# Patient Record
Sex: MALE | Race: WHITE | NOT HISPANIC OR LATINO
[De-identification: names, ages, dates, MRNs, and addresses within clinical notes are randomized per-mention and may not be internally consistent; named-entity substitution may affect disease eponyms.]

---

## 2019-03-10 ENCOUNTER — FORM ENCOUNTER (OUTPATIENT)
Age: 54
End: 2019-03-10

## 2019-03-11 ENCOUNTER — OUTPATIENT (OUTPATIENT)
Dept: OUTPATIENT SERVICES | Facility: HOSPITAL | Age: 54
LOS: 1 days | End: 2019-03-11
Payer: COMMERCIAL

## 2019-03-11 ENCOUNTER — APPOINTMENT (OUTPATIENT)
Dept: PULMONOLOGY | Facility: CLINIC | Age: 54
End: 2019-03-11
Payer: COMMERCIAL

## 2019-03-11 VITALS — BODY MASS INDEX: 44.1 KG/M2 | HEIGHT: 71 IN | WEIGHT: 315 LBS

## 2019-03-11 VITALS — HEART RATE: 76 BPM | OXYGEN SATURATION: 97 % | SYSTOLIC BLOOD PRESSURE: 124 MMHG | DIASTOLIC BLOOD PRESSURE: 76 MMHG

## 2019-03-11 DIAGNOSIS — Z80.6 FAMILY HISTORY OF LEUKEMIA: ICD-10-CM

## 2019-03-11 DIAGNOSIS — Z87.898 PERSONAL HISTORY OF OTHER SPECIFIED CONDITIONS: ICD-10-CM

## 2019-03-11 DIAGNOSIS — I25.10 ATHEROSCLEROTIC HEART DISEASE OF NATIVE CORONARY ARTERY W/OUT ANGINA PECTORIS: ICD-10-CM

## 2019-03-11 DIAGNOSIS — Z78.9 OTHER SPECIFIED HEALTH STATUS: ICD-10-CM

## 2019-03-11 DIAGNOSIS — R06.02 SHORTNESS OF BREATH: ICD-10-CM

## 2019-03-11 DIAGNOSIS — Z00.00 ENCOUNTER FOR GENERAL ADULT MEDICAL EXAMINATION W/OUT ABNORMAL FINDINGS: ICD-10-CM

## 2019-03-11 DIAGNOSIS — Z01.811 ENCOUNTER FOR PREPROCEDURAL RESPIRATORY EXAMINATION: ICD-10-CM

## 2019-03-11 PROCEDURE — 94664 DEMO&/EVAL PT USE INHALER: CPT | Mod: 59

## 2019-03-11 PROCEDURE — 94729 DIFFUSING CAPACITY: CPT

## 2019-03-11 PROCEDURE — 99204 OFFICE O/P NEW MOD 45 MIN: CPT | Mod: 25

## 2019-03-11 PROCEDURE — 94727 GAS DIL/WSHOT DETER LNG VOL: CPT

## 2019-03-11 PROCEDURE — 71046 X-RAY EXAM CHEST 2 VIEWS: CPT | Mod: 26

## 2019-03-11 PROCEDURE — 85018 HEMOGLOBIN: CPT | Mod: QW

## 2019-03-11 PROCEDURE — 94060 EVALUATION OF WHEEZING: CPT

## 2019-03-11 RX ORDER — BLOOD SUGAR DIAGNOSTIC
100 STRIP MISCELLANEOUS
Refills: 0 | Status: ACTIVE | COMMUNITY

## 2019-03-11 NOTE — DISCUSSION/SUMMARY
[FreeTextEntry1] : \par #1. Trial of Incruse for moderate COPD\par #2. ProAir as needed\par #3. There may be a restrictive component with weight due to morbid obesity\par #4. Home PSG to evaluate for possible MELIZA; consider empiric CPAP for presumed MELIZA post-op\par #5. Preop CXR\par #6. Diet and exercise for weight loss \par #7. F/u in 2 weeks with CXR and spirometry to assess response to Incruse\par #8. Further pre-op recommendations will be forthcoming pending the above w/u\par

## 2019-03-11 NOTE — PROCEDURE
[FreeTextEntry1] : PFTs 3/11/19 - Mildly reduced FVC and moderately reduced FEV1 with an obstructive pattern but without a significant BD response. Lung volumes were near normal with a normal diffusion capacity.

## 2019-03-11 NOTE — CONSULT LETTER
[Dear  ___] : Dear  [unfilled], [Consult Letter:] : I had the pleasure of evaluating your patient, [unfilled]. [Please see my note below.] : Please see my note below. [Consult Closing:] : Thank you very much for allowing me to participate in the care of this patient.  If you have any questions, please do not hesitate to contact me. [Sincerely,] : Sincerely, [DrJayson  ___] : Dr. ALFORD [FreeTextEntry3] : Elvis Hanna MD, FCCP, SANDHYA. ABSM\par

## 2019-03-11 NOTE — REVIEW OF SYSTEMS
[As Noted in HPI] : as noted in HPI [Fever] : no fever [Chills] : no chills [Dry Eyes] : no dryness of the eyes [Eye Irritation] : no ~T irritation of the eyes [Nasal Congestion] : no nasal congestion [Epistaxis] : no nosebleeds [Postnasal Drip] : no postnasal drip [Sinus Problems] : no sinus problems [Cough] : no cough [Sputum] : not coughing up ~M sputum [Dyspnea] : no dyspnea [Chest Tightness] : no chest tightness [Pleuritic Pain] : no pleuritic pain [Wheezing] : no wheezing [Hypertension] : no ~T hypertension [Chest Discomfort] : no chest discomfort [Dysrhythmia] : no dysrhythmia [Murmurs] : no murmurs were heard [Palpitations] : no palpitations [Edema] : ~T edema was not present [Hay Fever] : no hay fever [Itchy Eyes] : no itching of ~T the eyes [Reflux] : no reflux [Nausea] : no nausea [Vomiting] : no vomiting [Constipation] : no constipation [Diarrhea] : no diarrhea [Abdominal Pain] : no abdominal pain [Dysuria] : no dysuria [Trauma] : no ~T physical trauma [Fracture] : no fracture [Anemia] : no anemia [Headache] : no headache [Dizziness] : no dizziness [Syncope] : no fainting [Numbness] : no numbness [Paralysis] : no paralysis was seen [Seizures] : no seizures [Depression] : no depression [Anxiety] : no anxiety [Diabetes] : no diabetes mellitus [Thyroid Problem] : no thyroid problem

## 2019-03-11 NOTE — HISTORY OF PRESENT ILLNESS
[Snoring] : snoring [Excess Weight] : excess weight [Dyspnea] : dyspnea [Back Pain] : back pain [Low Calorie Diet] : low calorie diet [Fair Compliance] : fair compliance with treatment [Fair Tolerance] : fair tolerance of treatment [Poor Symptom Control] : poor symptom control [Coronary Artery Disease] : coronary artery disease [High] : high [Low Calorie] : low calorie [Well Balanced Diet] : well balanced meals [___ Times/Week] : exercises [unfilled] times per week [Walking] : walking [FreeTextEntry1] : Patient c/o SOBOE but is otherwise without associated respiratory complaints. He reports these symptoms at higher altitudes. He is an ex-smoker of up to 1.5 ppd x 30 years but quit in 2017.\par He presents for evaluation prior to his upcoming hernia repair and subsequent bariatric surgery.\par  [Excessive Daytime Sleepiness] : no excessive daytime sleepiness [Witnessed Apnea During Sleep] : no witnessed apnea during sleep [Witnessed Gasping During Sleep] : no witnessed gasping during sleep [Unrefreshing Sleep] : no unrefreshing sleep [Sleepy When Sedentary] : no sleepy when sedentary [Initial Evaluation] : an initial evaluation of [Dyspnea on Exertion] : dyspnea on exertion [Currently Experiencing] : The patient is currently experiencing symptoms. [None] : The patient is not currently being treated for this problem

## 2019-03-20 ENCOUNTER — APPOINTMENT (OUTPATIENT)
Dept: PULMONOLOGY | Facility: CLINIC | Age: 54
End: 2019-03-20
Payer: COMMERCIAL

## 2019-03-20 VITALS
RESPIRATION RATE: 14 BRPM | BODY MASS INDEX: 43.1 KG/M2 | HEART RATE: 68 BPM | OXYGEN SATURATION: 97 % | WEIGHT: 309 LBS | SYSTOLIC BLOOD PRESSURE: 126 MMHG | DIASTOLIC BLOOD PRESSURE: 74 MMHG

## 2019-03-20 PROCEDURE — 94010 BREATHING CAPACITY TEST: CPT

## 2019-03-20 PROCEDURE — 99214 OFFICE O/P EST MOD 30 MIN: CPT | Mod: 25

## 2019-03-20 RX ORDER — OFLOXACIN 3 MG/ML
0.3 SOLUTION/ DROPS OPHTHALMIC
Qty: 5 | Refills: 0 | Status: DISCONTINUED | COMMUNITY
Start: 2018-11-06

## 2019-03-20 RX ORDER — PRAVASTATIN SODIUM 10 MG/1
10 TABLET ORAL
Qty: 30 | Refills: 0 | Status: DISCONTINUED | COMMUNITY
Start: 2018-04-25

## 2019-03-20 RX ORDER — NEBIVOLOL HYDROCHLORIDE 5 MG/1
5 TABLET ORAL
Qty: 30 | Refills: 0 | Status: ACTIVE | COMMUNITY
Start: 2018-04-25

## 2019-03-20 RX ORDER — CLOBETASOL PROPIONATE 0.5 MG/G
0.05 CREAM TOPICAL
Qty: 45 | Refills: 0 | Status: DISCONTINUED | COMMUNITY
Start: 2019-03-19

## 2019-03-20 RX ORDER — ECONAZOLE NITRATE 10 MG/G
1 CREAM TOPICAL
Qty: 85 | Refills: 0 | Status: DISCONTINUED | COMMUNITY
Start: 2018-09-07

## 2019-03-20 RX ORDER — NEBIVOLOL HYDROCHLORIDE 2.5 MG/1
2.5 TABLET ORAL
Refills: 0 | Status: DISCONTINUED | COMMUNITY
End: 2019-03-20

## 2019-03-20 RX ORDER — EVOLOCUMAB 140 MG/ML
140 INJECTION, SOLUTION SUBCUTANEOUS
Qty: 2 | Refills: 0 | Status: ACTIVE | COMMUNITY
Start: 2018-10-12

## 2019-03-20 RX ORDER — CLARITHROMYCIN 500 MG/1
500 TABLET, FILM COATED ORAL
Qty: 14 | Refills: 0 | Status: DISCONTINUED | COMMUNITY
Start: 2019-01-20

## 2019-03-20 RX ORDER — RAMIPRIL 2.5 MG/1
2.5 CAPSULE ORAL
Qty: 30 | Refills: 0 | Status: ACTIVE | COMMUNITY
Start: 2018-04-25

## 2019-03-20 RX ORDER — PRASUGREL 10 MG/1
10 TABLET, FILM COATED ORAL
Qty: 30 | Refills: 0 | Status: DISCONTINUED | COMMUNITY
Start: 2018-06-04

## 2019-03-20 NOTE — HISTORY OF PRESENT ILLNESS
[Snoring] : snoring [Excess Weight] : excess weight [Dyspnea] : dyspnea [Back Pain] : back pain [Low Calorie Diet] : low calorie diet [Fair Compliance] : fair compliance with treatment [Fair Tolerance] : fair tolerance of treatment [Poor Symptom Control] : poor symptom control [Coronary Artery Disease] : coronary artery disease [High] : high [Low Calorie] : low calorie [Well Balanced Diet] : well balanced meals [___ Times/Week] : exercises [unfilled] times per week [Walking] : walking [Follow-Up - Routine Clinic] : a routine clinic follow-up of [Dyspnea on Exertion] : dyspnea on exertion [Currently Experiencing] : The patient is currently experiencing symptoms. [None] : No associated symptoms are reported [FreeTextEntry1] : Patient c/o SOBOE but is otherwise without associated respiratory complaints. He reports these symptoms at higher altitudes. He is an ex-smoker of up to 1.5 ppd x 30 years but quit in 2017.\par He presents for evaluation prior to his upcoming hernia repair and subsequent bariatric surgery.\par  [Excessive Daytime Sleepiness] : no excessive daytime sleepiness [Witnessed Apnea During Sleep] : no witnessed apnea during sleep [Witnessed Gasping During Sleep] : no witnessed gasping during sleep [Sleepy When Sedentary] : no sleepy when sedentary [Unrefreshing Sleep] : no unrefreshing sleep [Anticholinergics (Inhalation)] : inhaled anticholinergics [Short-Acting Beta Agonists] : short-acting beta agonists [Good Compliance] : good compliance with treatment [Good Tolerance] : good tolerance of treatment [Good Symptom Control] : good symptom control

## 2019-03-20 NOTE — REVIEW OF SYSTEMS
[As Noted in HPI] : as noted in HPI [Fever] : no fever [Chills] : no chills [Dry Eyes] : no dryness of the eyes [Eye Irritation] : no ~T irritation of the eyes [Nasal Congestion] : no nasal congestion [Epistaxis] : no nosebleeds [Postnasal Drip] : no postnasal drip [Sinus Problems] : no sinus problems [Cough] : no cough [Sputum] : not coughing up ~M sputum [Dyspnea] : no dyspnea [Chest Tightness] : no chest tightness [Pleuritic Pain] : no pleuritic pain [Wheezing] : no wheezing [Hypertension] : no ~T hypertension [Chest Discomfort] : no chest discomfort [Dysrhythmia] : no dysrhythmia [Murmurs] : no murmurs were heard [Palpitations] : no palpitations [Edema] : ~T edema was not present [Hay Fever] : no hay fever [Itchy Eyes] : no itching of ~T the eyes [Reflux] : no reflux [Nausea] : no nausea [Vomiting] : no vomiting [Constipation] : no constipation [Diarrhea] : no diarrhea [Abdominal Pain] : no abdominal pain [Dysuria] : no dysuria [Trauma] : no ~T physical trauma [Anemia] : no anemia [Fracture] : no fracture [Headache] : no headache [Dizziness] : no dizziness [Numbness] : no numbness [Syncope] : no fainting [Seizures] : no seizures [Paralysis] : no paralysis was seen [Depression] : no depression [Anxiety] : no anxiety [Diabetes] : no diabetes mellitus [Thyroid Problem] : no thyroid problem

## 2019-03-20 NOTE — PROCEDURE
[FreeTextEntry1] : PFTs 3/11/19 - Mildly reduced FVC and moderately reduced FEV1 with an obstructive pattern but without a significant BD response. Lung volumes were near normal with a normal diffusion capacity.\par Spirometry 3/20/19 - Moderate COPD without change from previous despite Incruse though has only been on it for 10 days.

## 2019-03-20 NOTE — REASON FOR VISIT
[Follow-Up] : a follow-up visit [COPD] : COPD [Shortness of Breath] : shortness of Breath [Sleep Apnea] : sleep apnea [FreeTextEntry2] : morbid obesity, clearance

## 2019-03-20 NOTE — DISCUSSION/SUMMARY
[FreeTextEntry1] : \par #1. Trial of Incruse for moderate COPD; consider change to Breo if no improvement\par #2. ProAir as needed\par #3. There may be a restrictive component with weight due to morbid obesity\par #4. Home PSG to evaluate for possible MELIZA; consider empiric CPAP for presumed MELIZA post-op\par #5. Preop CXR was clear\par #6. Diet and exercise for weight loss \par #7. F/u after PSG with PFTs\par #8. The patient is at mildly increased risk for post op respiratory complications including but not limited to respiratory failure or prolonged intubation if GA is used. These risks are not prohibitive for GA although an alternate anesthesia would be preferred if able. I would recommend Duoneb therapy on call to the OR with Duoneb Q6h post-op until the patient can resume inhaler therapy. I would also recommend post op incentive spirometry, GI/DVT prophylaxis as needed, early ambulation as able, and adequate pain control. The patient has not had his PSG to evaluate for MELIZA so no sleep assessment can be made but would recommend empiric CPAP at 14 cm of water if needed post-op and with sleep\par #9. F/u with PCP for w/u of possible anemia given prior low FS Hgb \par

## 2019-03-21 ENCOUNTER — APPOINTMENT (OUTPATIENT)
Dept: PULMONOLOGY | Facility: CLINIC | Age: 54
End: 2019-03-21

## 2019-03-27 ENCOUNTER — RESULT REVIEW (OUTPATIENT)
Age: 54
End: 2019-03-27

## 2019-04-05 ENCOUNTER — OUTPATIENT (OUTPATIENT)
Dept: OUTPATIENT SERVICES | Facility: HOSPITAL | Age: 54
LOS: 1 days | End: 2019-04-05
Payer: COMMERCIAL

## 2019-04-05 DIAGNOSIS — G47.33 OBSTRUCTIVE SLEEP APNEA (ADULT) (PEDIATRIC): ICD-10-CM

## 2019-04-05 PROCEDURE — G0399: CPT

## 2019-04-05 PROCEDURE — 95806 SLEEP STUDY UNATT&RESP EFFT: CPT

## 2019-04-05 PROCEDURE — 95806 SLEEP STUDY UNATT&RESP EFFT: CPT | Mod: 26

## 2019-05-28 ENCOUNTER — APPOINTMENT (OUTPATIENT)
Dept: PULMONOLOGY | Facility: CLINIC | Age: 54
End: 2019-05-28
Payer: COMMERCIAL

## 2019-05-28 VITALS — HEIGHT: 71 IN | WEIGHT: 315 LBS | BODY MASS INDEX: 44.1 KG/M2

## 2019-05-28 VITALS — OXYGEN SATURATION: 97 % | SYSTOLIC BLOOD PRESSURE: 128 MMHG | DIASTOLIC BLOOD PRESSURE: 84 MMHG | HEART RATE: 63 BPM

## 2019-05-28 PROCEDURE — 94060 EVALUATION OF WHEEZING: CPT

## 2019-05-28 PROCEDURE — 94664 DEMO&/EVAL PT USE INHALER: CPT | Mod: 59

## 2019-05-28 PROCEDURE — 99214 OFFICE O/P EST MOD 30 MIN: CPT | Mod: 25

## 2019-05-28 NOTE — HISTORY OF PRESENT ILLNESS
[Snoring] : snoring [Dyspnea] : dyspnea [Excess Weight] : excess weight [Low Calorie Diet] : low calorie diet [Back Pain] : back pain [Fair Tolerance] : fair tolerance of treatment [Fair Compliance] : fair compliance with treatment [Poor Symptom Control] : poor symptom control [Coronary Artery Disease] : coronary artery disease [High] : high [Low Calorie] : low calorie [Well Balanced Diet] : well balanced meals [Walking] : walking [___ Times/Week] : exercises [unfilled] times per week [Follow-Up - Routine Clinic] : a routine clinic follow-up of [Dyspnea on Exertion] : dyspnea on exertion [None] : No associated symptoms are reported [Currently Experiencing] : The patient is currently experiencing symptoms. [Anticholinergics (Inhalation)] : inhaled anticholinergics [Short-Acting Beta Agonists] : short-acting beta agonists [Good Compliance] : good compliance with treatment [Good Tolerance] : good tolerance of treatment [Good Symptom Control] : good symptom control [FreeTextEntry1] : Patient c/o SOBOE but is otherwise without associated respiratory complaints. He reports these symptoms at higher altitudes. He is an ex-smoker of up to 1.5 ppd x 30 years but quit in 2017.\par He presents for evaluation prior to his upcoming hernia repair and subsequent bariatric surgery.\par  [Excessive Daytime Sleepiness] : no excessive daytime sleepiness [Witnessed Apnea During Sleep] : no witnessed apnea during sleep [Unrefreshing Sleep] : no unrefreshing sleep [Witnessed Gasping During Sleep] : no witnessed gasping during sleep [Sleepy When Sedentary] : no sleepy when sedentary

## 2019-05-28 NOTE — DISCUSSION/SUMMARY
[FreeTextEntry1] : \par #1. Trial of Breo 100 for moderate COPD given no improvement with Incruse\par #2. ProAir as needed\par #3. There may be a restrictive component with weight due to morbid obesity\par #4. CPAP titration study to treat severe MELIZA; otherwise could consider autoCPAP for therapy\par #5. Preop CXR was clear\par #6. Diet and exercise for weight loss \par #7. F/u one month after starting CPAP therapy\par #8. F/u with PCP for w/u of possible anemia given prior low FS Hgb \par

## 2019-05-28 NOTE — CONSULT LETTER
[Dear  ___] : Dear  [unfilled], [Consult Closing:] : Thank you very much for allowing me to participate in the care of this patient.  If you have any questions, please do not hesitate to contact me. [Consult Letter:] : I had the pleasure of evaluating your patient, [unfilled]. [Please see my note below.] : Please see my note below. [Sincerely,] : Sincerely, [DrJayson  ___] : Dr. ALFORD [FreeTextEntry3] : Elvis Hanna MD, FCCP, SANDHYA. ABSM\par

## 2019-05-28 NOTE — REVIEW OF SYSTEMS
[As Noted in HPI] : as noted in HPI [Dry Eyes] : no dryness of the eyes [Eye Irritation] : no ~T irritation of the eyes [Chills] : no chills [Fever] : no fever [Epistaxis] : no nosebleeds [Nasal Congestion] : no nasal congestion [Postnasal Drip] : no postnasal drip [Sinus Problems] : no sinus problems [Cough] : no cough [Dyspnea] : no dyspnea [Sputum] : not coughing up ~M sputum [Chest Tightness] : no chest tightness [Pleuritic Pain] : no pleuritic pain [Wheezing] : no wheezing [Hypertension] : no ~T hypertension [Chest Discomfort] : no chest discomfort [Dysrhythmia] : no dysrhythmia [Murmurs] : no murmurs were heard [Palpitations] : no palpitations [Hay Fever] : no hay fever [Edema] : ~T edema was not present [Reflux] : no reflux [Itchy Eyes] : no itching of ~T the eyes [Nausea] : no nausea [Vomiting] : no vomiting [Diarrhea] : no diarrhea [Constipation] : no constipation [Abdominal Pain] : no abdominal pain [Dysuria] : no dysuria [Anemia] : no anemia [Fracture] : no fracture [Trauma] : no ~T physical trauma [Dizziness] : no dizziness [Syncope] : no fainting [Headache] : no headache [Paralysis] : no paralysis was seen [Numbness] : no numbness [Depression] : no depression [Seizures] : no seizures [Anxiety] : no anxiety [Thyroid Problem] : no thyroid problem [Diabetes] : no diabetes mellitus

## 2019-05-28 NOTE — PHYSICAL EXAM
[General Appearance - Well Developed] : well developed [General Appearance - In No Acute Distress] : no acute distress [Normal Appearance] : normal appearance [Normal Conjunctiva] : the conjunctiva exhibited no abnormalities [Low Lying Soft Palate] : low lying soft palate [Enlarged Base of the Tongue] : enlargement of the base of the tongue [II] : II [Neck Appearance] : the appearance of the neck was normal [Heart Sounds] : normal S1 and S2 [Heart Rate And Rhythm] : heart rate and rhythm were normal [] : no respiratory distress [Murmurs] : no murmurs present [Edema] : no peripheral edema present [Respiration, Rhythm And Depth] : normal respiratory rhythm and effort [Exaggerated Use Of Accessory Muscles For Inspiration] : no accessory muscle use [Auscultation Breath Sounds / Voice Sounds] : lungs were clear to auscultation bilaterally [Abdomen Soft] : soft [Abdomen Tenderness] : non-tender [Abnormal Walk] : normal gait [Nail Clubbing] : no clubbing of the fingernails [Cyanosis, Localized] : no localized cyanosis [No Focal Deficits] : no focal deficits [Oriented To Time, Place, And Person] : oriented to person, place, and time [Elongated Uvula] : no elongated uvula [FreeTextEntry1] : No abnormalities.

## 2019-05-28 NOTE — PROCEDURE
[FreeTextEntry1] : PFTs 3/11/19 - Mildly reduced FVC and moderately reduced FEV1 with an obstructive pattern but without a significant BD response. Lung volumes were near normal with a normal diffusion capacity.\par Spirometry 3/20/19 - Moderate COPD without change from previous despite Incruse though has only been on it for 10 days.\par Spirometry 5/28/19 - Moderate COPD despite Incruse so will change to Breo 100

## 2019-09-05 ENCOUNTER — OUTPATIENT (OUTPATIENT)
Dept: OUTPATIENT SERVICES | Facility: HOSPITAL | Age: 54
LOS: 1 days | End: 2019-09-05
Payer: COMMERCIAL

## 2019-09-05 DIAGNOSIS — G47.33 OBSTRUCTIVE SLEEP APNEA (ADULT) (PEDIATRIC): ICD-10-CM

## 2019-09-05 PROCEDURE — 95811 POLYSOM 6/>YRS CPAP 4/> PARM: CPT

## 2019-09-05 PROCEDURE — 95811 POLYSOM 6/>YRS CPAP 4/> PARM: CPT | Mod: 26

## 2019-09-17 ENCOUNTER — RX RENEWAL (OUTPATIENT)
Age: 54
End: 2019-09-17

## 2019-10-03 ENCOUNTER — APPOINTMENT (OUTPATIENT)
Dept: PULMONOLOGY | Facility: CLINIC | Age: 54
End: 2019-10-03
Payer: COMMERCIAL

## 2019-10-03 VITALS — SYSTOLIC BLOOD PRESSURE: 120 MMHG | OXYGEN SATURATION: 96 % | DIASTOLIC BLOOD PRESSURE: 70 MMHG | HEART RATE: 92 BPM

## 2019-10-03 DIAGNOSIS — Z01.811 ENCOUNTER FOR PREPROCEDURAL RESPIRATORY EXAMINATION: ICD-10-CM

## 2019-10-03 PROCEDURE — 85018 HEMOGLOBIN: CPT | Mod: QW

## 2019-10-03 PROCEDURE — 99215 OFFICE O/P EST HI 40 MIN: CPT | Mod: 25

## 2019-10-03 PROCEDURE — 94010 BREATHING CAPACITY TEST: CPT

## 2019-10-03 PROCEDURE — 94727 GAS DIL/WSHOT DETER LNG VOL: CPT

## 2019-10-03 PROCEDURE — 94729 DIFFUSING CAPACITY: CPT

## 2019-10-03 NOTE — HISTORY OF PRESENT ILLNESS
[Snoring] : snoring [Excess Weight] : excess weight [Dyspnea] : dyspnea [Back Pain] : back pain [Low Calorie Diet] : low calorie diet [Fair Compliance] : fair compliance with treatment [Fair Tolerance] : fair tolerance of treatment [Poor Symptom Control] : poor symptom control [Coronary Artery Disease] : coronary artery disease [High] : high [Low Calorie] : low calorie [Well Balanced Diet] : well balanced meals [___ Times/Week] : exercises [unfilled] times per week [Walking] : walking [Follow-Up - Routine Clinic] : a routine clinic follow-up of [Dyspnea on Exertion] : dyspnea on exertion [Currently Experiencing] : The patient is currently experiencing symptoms. [None] : No associated symptoms are reported [Short-Acting Beta Agonists] : short-acting beta agonists [Anticholinergics (Inhalation)] : inhaled anticholinergics [Good Compliance] : good compliance with treatment [Good Tolerance] : good tolerance of treatment [Good Symptom Control] : good symptom control [Excessive Daytime Sleepiness] : no excessive daytime sleepiness [FreeTextEntry1] : Patient c/o SOBOE but is otherwise without associated respiratory complaints. He reports these symptoms at higher altitudes. He is an ex-smoker of up to 1.5 ppd x 30 years but quit in 2017.\par He is s/p hernia surgery and appendectomy. \par He now presents for evaluation prior to his upcoming colonoscopy and endoscopy in anticipation of his upcoming bariatric surgery.\par  [Witnessed Apnea During Sleep] : no witnessed apnea during sleep [Witnessed Gasping During Sleep] : no witnessed gasping during sleep [Unrefreshing Sleep] : no unrefreshing sleep [Sleepy When Sedentary] : no sleepy when sedentary

## 2019-10-03 NOTE — DISCUSSION/SUMMARY
[FreeTextEntry1] : \par #1. Continue Breo 100 for moderate COPD given no improvement with Incruse; pt appears to be doing well with this inhaler\par #2. ProAir as needed\par #3. There may be a restrictive component with weight due to morbid obesity\par #4. Pt has severe MELIZA and autoCPAP was recommended but he does not feel he could tolerate CPAP therapy and would rather try an oral appliance instead\par #5. Preop CXR was clear\par #6. Diet and exercise for weight loss \par #7. Referred pt for a dental evaluation for an oral appliance as alternative therapy for treatment of MELIZA as he does not feel he can tolerate CPAP\par #8. F/u with PCP/GI for w/u of possible anemia given prior low FS Hgb \par #9. The patient is at mildly increased risk for post op respiratory complications including but not limited to respiratory failure under conscious sedation given his severe MELIZA and COPD. These risks are not prohibitive for the procedure but I would recommend Duoneb therapy on call to the OR with Duoneb Q6h post-op until the patient can resume inhaler therapy (Breo 100). I would also recommend post op incentive spirometry, GI/DVT prophylaxis as needed, early ambulation as able, and adequate pain control. Would recommend that O2 saturation should be monitored during and after the procedure. Finally, pt should have CPAP at 12 cm of water available to him during the colonoscopy and after the endoscopy.\par #10. F/u in 2-3 months

## 2019-10-03 NOTE — CONSULT LETTER
[Dear  ___] : Dear  [unfilled], [Consult Letter:] : I had the pleasure of evaluating your patient, [unfilled]. [Please see my note below.] : Please see my note below. [Consult Closing:] : Thank you very much for allowing me to participate in the care of this patient.  If you have any questions, please do not hesitate to contact me. [Sincerely,] : Sincerely, [DrJayson  ___] : Dr. ALFORD [FreeTextEntry3] : Elvis Hanna MD, FCCP, D. ABSM\par Pulmonary and Sleep Medicine\par [facility] [place]\par \par

## 2019-10-03 NOTE — REVIEW OF SYSTEMS
[As Noted in HPI] : as noted in HPI [Fever] : no fever [Chills] : no chills [Dry Eyes] : no dryness of the eyes [Eye Irritation] : no ~T irritation of the eyes [Nasal Congestion] : no nasal congestion [Postnasal Drip] : no postnasal drip [Epistaxis] : no nosebleeds [Cough] : no cough [Sinus Problems] : no sinus problems [Sputum] : not coughing up ~M sputum [Dyspnea] : no dyspnea [Chest Tightness] : no chest tightness [Pleuritic Pain] : no pleuritic pain [Wheezing] : no wheezing [Hypertension] : no ~T hypertension [Dysrhythmia] : no dysrhythmia [Chest Discomfort] : no chest discomfort [Murmurs] : no murmurs were heard [Palpitations] : no palpitations [Itchy Eyes] : no itching of ~T the eyes [Edema] : ~T edema was not present [Hay Fever] : no hay fever [Nausea] : no nausea [Reflux] : no reflux [Constipation] : no constipation [Vomiting] : no vomiting [Diarrhea] : no diarrhea [Dysuria] : no dysuria [Abdominal Pain] : no abdominal pain [Trauma] : no ~T physical trauma [Fracture] : no fracture [Anemia] : no anemia [Headache] : no headache [Dizziness] : no dizziness [Syncope] : no fainting [Numbness] : no numbness [Paralysis] : no paralysis was seen [Depression] : no depression [Anxiety] : no anxiety [Seizures] : no seizures [Thyroid Problem] : no thyroid problem [Diabetes] : no diabetes mellitus

## 2019-10-03 NOTE — PROCEDURE
[FreeTextEntry1] : PFTs 3/11/19 - Mildly reduced FVC and moderately reduced FEV1 with an obstructive pattern but without a significant BD response. Lung volumes were near normal with a normal diffusion capacity.\par Spirometry 3/20/19 - Moderate COPD without change from previous despite Incruse though has only been on it for 10 days.\par Spirometry 5/28/19 - Moderate COPD despite Incruse so will change to Breo 100\par PFTs 10/3/19 - Mildly reduced FVC and moderately reduced FEV1 with an obstructive pattern; lung volumes were normal except for an elevated RV with a mildly reduced diffusion capacity which corrected for alveolar volume.

## 2019-10-03 NOTE — PHYSICAL EXAM
[Normal Appearance] : normal appearance [General Appearance - Well Developed] : well developed [General Appearance - In No Acute Distress] : no acute distress [Normal Conjunctiva] : the conjunctiva exhibited no abnormalities [Low Lying Soft Palate] : low lying soft palate [Enlarged Base of the Tongue] : enlargement of the base of the tongue [II] : II [Neck Appearance] : the appearance of the neck was normal [Heart Sounds] : normal S1 and S2 [Heart Rate And Rhythm] : heart rate and rhythm were normal [Edema] : no peripheral edema present [Murmurs] : no murmurs present [] : no respiratory distress [Respiration, Rhythm And Depth] : normal respiratory rhythm and effort [Exaggerated Use Of Accessory Muscles For Inspiration] : no accessory muscle use [Auscultation Breath Sounds / Voice Sounds] : lungs were clear to auscultation bilaterally [Abdomen Soft] : soft [Abdomen Tenderness] : non-tender [Abnormal Walk] : normal gait [Nail Clubbing] : no clubbing of the fingernails [Cyanosis, Localized] : no localized cyanosis [No Focal Deficits] : no focal deficits [Oriented To Time, Place, And Person] : oriented to person, place, and time [Elongated Uvula] : no elongated uvula [FreeTextEntry1] : Moderate oropharyngeal crowding.

## 2019-11-14 ENCOUNTER — APPOINTMENT (OUTPATIENT)
Dept: PULMONOLOGY | Facility: CLINIC | Age: 54
End: 2019-11-14

## 2019-11-14 RX ORDER — VALACYCLOVIR 1 G/1
1 TABLET, FILM COATED ORAL
Qty: 30 | Refills: 0 | Status: DISCONTINUED | COMMUNITY
Start: 2019-04-09 | End: 2019-11-14

## 2019-11-14 RX ORDER — CIPROFLOXACIN HYDROCHLORIDE 500 MG/1
500 TABLET, FILM COATED ORAL
Qty: 20 | Refills: 0 | Status: DISCONTINUED | COMMUNITY
Start: 2019-07-13 | End: 2019-11-14

## 2019-11-19 ENCOUNTER — RX RENEWAL (OUTPATIENT)
Age: 54
End: 2019-11-19

## 2019-12-30 ENCOUNTER — RX RENEWAL (OUTPATIENT)
Age: 54
End: 2019-12-30

## 2020-01-08 ENCOUNTER — APPOINTMENT (OUTPATIENT)
Dept: PULMONOLOGY | Facility: CLINIC | Age: 55
End: 2020-01-08
Payer: COMMERCIAL

## 2020-01-08 VITALS — HEART RATE: 96 BPM | OXYGEN SATURATION: 96 % | SYSTOLIC BLOOD PRESSURE: 142 MMHG | DIASTOLIC BLOOD PRESSURE: 84 MMHG

## 2020-01-08 VITALS — HEIGHT: 70.5 IN | BODY MASS INDEX: 44.59 KG/M2 | WEIGHT: 315 LBS

## 2020-01-08 PROCEDURE — 99214 OFFICE O/P EST MOD 30 MIN: CPT | Mod: 25

## 2020-01-08 PROCEDURE — 94010 BREATHING CAPACITY TEST: CPT

## 2020-01-08 RX ORDER — KETOCONAZOLE 20.5 MG/ML
2 SHAMPOO, SUSPENSION TOPICAL
Qty: 120 | Refills: 0 | Status: DISCONTINUED | COMMUNITY
Start: 2019-01-07 | End: 2020-01-08

## 2020-01-08 RX ORDER — KETOCONAZOLE 20 MG/G
2 CREAM TOPICAL
Qty: 60 | Refills: 0 | Status: DISCONTINUED | COMMUNITY
Start: 2018-12-22 | End: 2020-01-08

## 2020-01-08 RX ORDER — MUPIROCIN 20 MG/G
2 OINTMENT TOPICAL
Qty: 22 | Refills: 0 | Status: DISCONTINUED | COMMUNITY
Start: 2019-04-09 | End: 2020-01-08

## 2020-01-08 NOTE — DISCUSSION/SUMMARY
[FreeTextEntry1] : \par #1. Continue Breo 100 for moderate COPD given no improvement with Incruse; pt appears to be doing well with this inhaler\par #2. ProAir as needed\par #3. There may be a restrictive component with weight due to morbid obesity\par #4. Pt has severe MELIZA and autoCPAP was recommended but he does not feel he could tolerate CPAP therapy and is using his oral appliance instead\par #5. Preop CXR was clear\par #6. Diet and exercise for weight loss \par #7. Dental f/u for oral appliance\par #8. F/u with PCP/GI for w/u of possible anemia given prior low FS Hgb \par #9. The patient is at mildly increased risk for post op respiratory complications including but not limited to respiratory failure under GA given his severe MELIZA and moderate COPD. These risks are not prohibitive for the procedure but I would recommend Duoneb therapy on call to the OR with Duoneb Q6h post-op until the patient can resume inhaler therapy (Breo 100). I would also recommend post op incentive spirometry, GI/DVT prophylaxis as needed, early ambulation as able, and adequate pain control. Would recommend that O2 saturation should be monitored during and after the procedure. Finally, pt should have CPAP at 12 cm of water available to him if needed but is currently being treated with an oral appliance for his MELIZA.\par #10. F/u in 2-3 months after surgery\par #11. Patient would be eligible for lung cancer screening in late 2021

## 2020-01-08 NOTE — REASON FOR VISIT
[COPD] : COPD [Follow-Up] : a follow-up visit [Sleep Apnea] : sleep apnea [Shortness of Breath] : shortness of Breath [FreeTextEntry2] : morbid obesity, clearance

## 2020-01-08 NOTE — CONSULT LETTER
[Dear  ___] : Dear  [unfilled], [Consult Letter:] : I had the pleasure of evaluating your patient, [unfilled]. [Sincerely,] : Sincerely, [Please see my note below.] : Please see my note below. [Consult Closing:] : Thank you very much for allowing me to participate in the care of this patient.  If you have any questions, please do not hesitate to contact me. [DrJayson  ___] : Dr. ALFORD [DrJayson ___] : Dr. ALFORD [FreeTextEntry3] : Elvis Hanna MD, FCCP, D. ABSM\par Pulmonary and Sleep Medicine\par [facility] [place]\par \par

## 2020-01-08 NOTE — REVIEW OF SYSTEMS
[As Noted in HPI] : as noted in HPI [Fever] : no fever [Chills] : no chills [Dry Eyes] : no dryness of the eyes [Eye Irritation] : no ~T irritation of the eyes [Nasal Congestion] : no nasal congestion [Epistaxis] : no nosebleeds [Postnasal Drip] : no postnasal drip [Sinus Problems] : no sinus problems [Cough] : no cough [Sputum] : not coughing up ~M sputum [Dyspnea] : no dyspnea [Chest Tightness] : no chest tightness [Pleuritic Pain] : no pleuritic pain [Hypertension] : no ~T hypertension [Wheezing] : no wheezing [Chest Discomfort] : no chest discomfort [Dysrhythmia] : no dysrhythmia [Murmurs] : no murmurs were heard [Palpitations] : no palpitations [Edema] : ~T edema was not present [Hay Fever] : no hay fever [Itchy Eyes] : no itching of ~T the eyes [Vomiting] : no vomiting [Nausea] : no nausea [Reflux] : no reflux [Constipation] : no constipation [Diarrhea] : no diarrhea [Abdominal Pain] : no abdominal pain [Dysuria] : no dysuria [Trauma] : no ~T physical trauma [Fracture] : no fracture [Anemia] : no anemia [Headache] : no headache [Dizziness] : no dizziness [Syncope] : no fainting [Numbness] : no numbness [Seizures] : no seizures [Paralysis] : no paralysis was seen [Depression] : no depression [Anxiety] : no anxiety [Diabetes] : no diabetes mellitus [Thyroid Problem] : no thyroid problem

## 2020-01-08 NOTE — HISTORY OF PRESENT ILLNESS
[Snoring] : snoring [Excess Weight] : excess weight [Back Pain] : back pain [Dyspnea] : dyspnea [Fair Compliance] : fair compliance with treatment [Low Calorie Diet] : low calorie diet [Fair Tolerance] : fair tolerance of treatment [Poor Symptom Control] : poor symptom control [High] : high [Coronary Artery Disease] : coronary artery disease [Low Calorie] : low calorie [Well Balanced Diet] : well balanced meals [Walking] : walking [___ Times/Week] : exercises [unfilled] times per week [Follow-Up - Routine Clinic] : a routine clinic follow-up of [Dyspnea on Exertion] : dyspnea on exertion [None] : No associated symptoms are reported [Currently Experiencing] : The patient is currently experiencing symptoms. [Good Compliance] : good compliance with treatment [Anticholinergics (Inhalation)] : inhaled anticholinergics [Short-Acting Beta Agonists] : short-acting beta agonists [Good Tolerance] : good tolerance of treatment [Good Symptom Control] : good symptom control [Excessive Daytime Sleepiness] : no excessive daytime sleepiness [FreeTextEntry1] : Patient c/o SOBOE but is otherwise without associated respiratory complaints. He reports these symptoms at higher altitudes. He is an ex-smoker of up to 1.5 ppd x 30 years but quit in 2017.\par He is s/p hernia surgery and appendectomy. \par He now s/p colonoscopy and endoscopy in 11/2019 in anticipation of his upcoming bariatric surgery.\par  [Witnessed Apnea During Sleep] : no witnessed apnea during sleep [Witnessed Gasping During Sleep] : no witnessed gasping during sleep [Sleepy When Sedentary] : no sleepy when sedentary [Unrefreshing Sleep] : no unrefreshing sleep

## 2020-01-08 NOTE — PHYSICAL EXAM
[General Appearance - Well Developed] : well developed [General Appearance - In No Acute Distress] : no acute distress [Normal Appearance] : normal appearance [Normal Conjunctiva] : the conjunctiva exhibited no abnormalities [Low Lying Soft Palate] : low lying soft palate [Enlarged Base of the Tongue] : enlargement of the base of the tongue [II] : II [Neck Appearance] : the appearance of the neck was normal [Murmurs] : no murmurs present [Heart Rate And Rhythm] : heart rate and rhythm were normal [Heart Sounds] : normal S1 and S2 [Edema] : no peripheral edema present [] : no respiratory distress [Respiration, Rhythm And Depth] : normal respiratory rhythm and effort [Exaggerated Use Of Accessory Muscles For Inspiration] : no accessory muscle use [Auscultation Breath Sounds / Voice Sounds] : lungs were clear to auscultation bilaterally [Abdomen Soft] : soft [Abdomen Tenderness] : non-tender [Nail Clubbing] : no clubbing of the fingernails [Abnormal Walk] : normal gait [Cyanosis, Localized] : no localized cyanosis [No Focal Deficits] : no focal deficits [Oriented To Time, Place, And Person] : oriented to person, place, and time [Elongated Uvula] : no elongated uvula [FreeTextEntry1] : No abnormalities.

## 2020-05-27 ENCOUNTER — APPOINTMENT (OUTPATIENT)
Dept: PULMONOLOGY | Facility: CLINIC | Age: 55
End: 2020-05-27
Payer: COMMERCIAL

## 2020-05-27 VITALS — BODY MASS INDEX: 38.19 KG/M2 | WEIGHT: 270 LBS | DIASTOLIC BLOOD PRESSURE: 86 MMHG | SYSTOLIC BLOOD PRESSURE: 128 MMHG

## 2020-05-27 VITALS — OXYGEN SATURATION: 97 % | HEART RATE: 74 BPM

## 2020-05-27 PROCEDURE — 99214 OFFICE O/P EST MOD 30 MIN: CPT

## 2020-05-27 RX ORDER — ALBUTEROL SULFATE 90 UG/1
108 (90 BASE) AEROSOL, METERED RESPIRATORY (INHALATION)
Qty: 3 | Refills: 0 | Status: DISCONTINUED | COMMUNITY
Start: 2019-03-11 | End: 2020-05-27

## 2020-05-27 RX ORDER — UMECLIDINIUM 62.5 UG/1
62.5 AEROSOL, POWDER ORAL DAILY
Qty: 1 | Refills: 5 | Status: DISCONTINUED | COMMUNITY
Start: 2019-03-11 | End: 2020-05-27

## 2020-05-27 NOTE — PHYSICAL EXAM
[General Appearance - Well Developed] : well developed [Normal Appearance] : normal appearance [General Appearance - In No Acute Distress] : no acute distress [Normal Conjunctiva] : the conjunctiva exhibited no abnormalities [Low Lying Soft Palate] : low lying soft palate [Enlarged Base of the Tongue] : enlargement of the base of the tongue [II] : II [Neck Appearance] : the appearance of the neck was normal [Heart Sounds] : normal S1 and S2 [Heart Rate And Rhythm] : heart rate and rhythm were normal [Murmurs] : no murmurs present [Edema] : no peripheral edema present [] : no respiratory distress [Respiration, Rhythm And Depth] : normal respiratory rhythm and effort [Auscultation Breath Sounds / Voice Sounds] : lungs were clear to auscultation bilaterally [Exaggerated Use Of Accessory Muscles For Inspiration] : no accessory muscle use [Abdomen Soft] : soft [Abdomen Tenderness] : non-tender [Nail Clubbing] : no clubbing of the fingernails [Abnormal Walk] : normal gait [Cyanosis, Localized] : no localized cyanosis [Oriented To Time, Place, And Person] : oriented to person, place, and time [No Focal Deficits] : no focal deficits [Elongated Uvula] : no elongated uvula [FreeTextEntry1] : No abnormalities.

## 2020-05-27 NOTE — DISCUSSION/SUMMARY
[FreeTextEntry1] : \par #1. Continue Breo 100 for moderate COPD given no improvement with Incruse; pt appears to be doing well with this inhaler\par #2. ProAir as needed\par #3. There may be a restrictive component with weight due to morbid obesity\par #4. Pt has severe MELIZA and autoCPAP was recommended but he does not feel he could tolerate CPAP therapy and is using his oral appliance instead\par #5. Preop CXR was clear\par #6. Diet and exercise for weight loss \par #7. Dental f/u for oral appliance\par #8. F/u with PCP/GI for w/u of possible anemia given prior low FS Hgb \par #9. F/u in 3 months\par #10. Patient would be eligible for lung cancer screening in late 2021 at age 56

## 2020-05-27 NOTE — HISTORY OF PRESENT ILLNESS
[Snoring] : snoring [Excess Weight] : excess weight [Dyspnea] : dyspnea [Back Pain] : back pain [Fair Compliance] : fair compliance with treatment [Low Calorie Diet] : low calorie diet [Fair Tolerance] : fair tolerance of treatment [Coronary Artery Disease] : coronary artery disease [Poor Symptom Control] : poor symptom control [High] : high [Low Calorie] : low calorie [___ Times/Week] : exercises [unfilled] times per week [Well Balanced Diet] : well balanced meals [Walking] : walking [Dyspnea on Exertion] : dyspnea on exertion [Follow-Up - Routine Clinic] : a routine clinic follow-up of [Currently Experiencing] : The patient is currently experiencing symptoms. [None] : No associated symptoms are reported [Short-Acting Beta Agonists] : short-acting beta agonists [Anticholinergics (Inhalation)] : inhaled anticholinergics [Good Compliance] : good compliance with treatment [Good Tolerance] : good tolerance of treatment [Good Symptom Control] : good symptom control [FreeTextEntry1] : Patient c/o SOBOE but is otherwise without associated respiratory complaints. He reports these symptoms at higher altitudes. He is an ex-smoker of up to 1.5 ppd x 30 years but quit in 2017. He gained 65 lbs when he quit smoking.\par He is s/p hernia surgery and appendectomy. \par He is doing well since his gastric sleeve surgery in 3/2020 and has lost nearly 50 lbs.\par  [Excessive Daytime Sleepiness] : no excessive daytime sleepiness [Witnessed Apnea During Sleep] : no witnessed apnea during sleep [Unrefreshing Sleep] : no unrefreshing sleep [Witnessed Gasping During Sleep] : no witnessed gasping during sleep [Sleepy When Sedentary] : no sleepy when sedentary

## 2020-05-27 NOTE — REVIEW OF SYSTEMS
[As Noted in HPI] : as noted in HPI [Chills] : no chills [Fever] : no fever [Dry Eyes] : no dryness of the eyes [Nasal Congestion] : no nasal congestion [Eye Irritation] : no ~T irritation of the eyes [Epistaxis] : no nosebleeds [Postnasal Drip] : no postnasal drip [Sinus Problems] : no sinus problems [Cough] : no cough [Sputum] : not coughing up ~M sputum [Dyspnea] : no dyspnea [Chest Tightness] : no chest tightness [Pleuritic Pain] : no pleuritic pain [Hypertension] : no ~T hypertension [Wheezing] : no wheezing [Dysrhythmia] : no dysrhythmia [Chest Discomfort] : no chest discomfort [Palpitations] : no palpitations [Murmurs] : no murmurs were heard [Edema] : ~T edema was not present [Hay Fever] : no hay fever [Itchy Eyes] : no itching of ~T the eyes [Reflux] : no reflux [Nausea] : no nausea [Vomiting] : no vomiting [Constipation] : no constipation [Abdominal Pain] : no abdominal pain [Diarrhea] : no diarrhea [Dysuria] : no dysuria [Trauma] : no ~T physical trauma [Anemia] : no anemia [Fracture] : no fracture [Headache] : no headache [Dizziness] : no dizziness [Numbness] : no numbness [Syncope] : no fainting [Paralysis] : no paralysis was seen [Seizures] : no seizures [Anxiety] : no anxiety [Depression] : no depression [Diabetes] : no diabetes mellitus [Thyroid Problem] : no thyroid problem

## 2020-05-27 NOTE — CONSULT LETTER
[Dear  ___] : Dear  [unfilled], [Consult Letter:] : I had the pleasure of evaluating your patient, [unfilled]. [Sincerely,] : Sincerely, [Please see my note below.] : Please see my note below. [Consult Closing:] : Thank you very much for allowing me to participate in the care of this patient.  If you have any questions, please do not hesitate to contact me. [DrJayson  ___] : Dr. ALFORD [DrJayson ___] : Dr. ALFORD [FreeTextEntry3] : Elvis Hanna MD, FCCP, D. ABSM\par Pulmonary and Sleep Medicine\par [facility] [place]\par \par

## 2020-05-27 NOTE — PROCEDURE
[FreeTextEntry1] : PFTs 3/11/19 - Mildly reduced FVC and moderately reduced FEV1 with an obstructive pattern but without a significant BD response. Lung volumes were near normal with a normal diffusion capacity.\par Spirometry 3/20/19 - Moderate COPD without change from previous despite Incruse though has only been on it for 10 days.\par Spirometry 5/28/19 - Moderate COPD despite Incruse so will change to Breo 100\par PFTs 10/3/19 - Mildly reduced FVC and moderately reduced FEV1 with an obstructive pattern; lung volumes were normal except for an elevated RV with a mildly reduced diffusion capacity which corrected for alveolar volume.\par Spirometry 1/8/20 - Mildly reduced FVC and moderately reduced FEV1 with an obstructive pattern without significant change

## 2020-08-12 DIAGNOSIS — Z01.818 ENCOUNTER FOR OTHER PREPROCEDURAL EXAMINATION: ICD-10-CM

## 2020-08-14 ENCOUNTER — APPOINTMENT (OUTPATIENT)
Dept: DISASTER EMERGENCY | Facility: CLINIC | Age: 55
End: 2020-08-14

## 2020-08-15 LAB — SARS-COV-2 N GENE NPH QL NAA+PROBE: NOT DETECTED

## 2020-08-19 ENCOUNTER — APPOINTMENT (OUTPATIENT)
Dept: PULMONOLOGY | Facility: CLINIC | Age: 55
End: 2020-08-19
Payer: COMMERCIAL

## 2020-08-19 VITALS — DIASTOLIC BLOOD PRESSURE: 78 MMHG | OXYGEN SATURATION: 98 % | SYSTOLIC BLOOD PRESSURE: 126 MMHG | HEART RATE: 76 BPM

## 2020-08-19 VITALS — WEIGHT: 250 LBS | BODY MASS INDEX: 35.79 KG/M2 | HEIGHT: 70 IN

## 2020-08-19 PROCEDURE — 99214 OFFICE O/P EST MOD 30 MIN: CPT | Mod: 25

## 2020-08-19 PROCEDURE — 94729 DIFFUSING CAPACITY: CPT

## 2020-08-19 PROCEDURE — 94727 GAS DIL/WSHOT DETER LNG VOL: CPT

## 2020-08-19 PROCEDURE — 85018 HEMOGLOBIN: CPT | Mod: QW

## 2020-08-19 PROCEDURE — 94010 BREATHING CAPACITY TEST: CPT

## 2020-08-19 RX ORDER — CALCIPOTRIENE AND BETAMETHASONE DIPROPIONATE 50; .5 UG/G; MG/G
0.005-0.064 SUSPENSION TOPICAL
Qty: 60 | Refills: 0 | Status: DISCONTINUED | COMMUNITY
Start: 2019-01-04 | End: 2020-08-19

## 2020-08-19 NOTE — REVIEW OF SYSTEMS
[As Noted in HPI] : as noted in HPI [Chills] : no chills [Fever] : no fever [Eye Irritation] : no ~T irritation of the eyes [Dry Eyes] : no dryness of the eyes [Nasal Congestion] : no nasal congestion [Epistaxis] : no nosebleeds [Postnasal Drip] : no postnasal drip [Sinus Problems] : no sinus problems [Cough] : no cough [Dyspnea] : no dyspnea [Chest Tightness] : no chest tightness [Sputum] : not coughing up ~M sputum [Pleuritic Pain] : no pleuritic pain [Hypertension] : no ~T hypertension [Wheezing] : no wheezing [Dysrhythmia] : no dysrhythmia [Chest Discomfort] : no chest discomfort [Murmurs] : no murmurs were heard [Palpitations] : no palpitations [Edema] : ~T edema was not present [Hay Fever] : no hay fever [Reflux] : no reflux [Itchy Eyes] : no itching of ~T the eyes [Nausea] : no nausea [Constipation] : no constipation [Vomiting] : no vomiting [Abdominal Pain] : no abdominal pain [Dysuria] : no dysuria [Trauma] : no ~T physical trauma [Diarrhea] : no diarrhea [Anemia] : no anemia [Fracture] : no fracture [Dizziness] : no dizziness [Headache] : no headache [Syncope] : no fainting [Paralysis] : no paralysis was seen [Seizures] : no seizures [Numbness] : no numbness [Anxiety] : no anxiety [Depression] : no depression [Diabetes] : no diabetes mellitus [Thyroid Problem] : no thyroid problem

## 2020-08-19 NOTE — PROCEDURE
[FreeTextEntry1] : PFTs 3/11/19 - Mildly reduced FVC and moderately reduced FEV1 with an obstructive pattern but without a significant BD response. Lung volumes were near normal with a normal diffusion capacity.\par Spirometry 3/20/19 - Moderate COPD without change from previous despite Incruse though has only been on it for 10 days.\par Spirometry 5/28/19 - Moderate COPD despite Incruse so will change to Breo 100\par PFTs 10/3/19 - Mildly reduced FVC and moderately reduced FEV1 with an obstructive pattern; lung volumes were normal except for an elevated RV with a mildly reduced diffusion capacity which corrected for alveolar volume.\par Spirometry 1/8/20 - Mildly reduced FVC and moderately reduced FEV1 with an obstructive pattern without significant change\par PFTs 8/19/20 - Mildly reduced FVC and FEV1 without an obstructive pattern but improved from previous; with essentially normal lung volumes and diffusion capacity

## 2020-08-19 NOTE — DISCUSSION/SUMMARY
[FreeTextEntry1] : \par #1. Continue Breo 100 for moderate COPD given no improvement with Incruse; pt appears to be doing well with this inhaler; consider d/c if lung function normalizes\par #2. ProAir as needed\par #3. There may be a restrictive component with weight due to morbid obesity but appears to be improving with weight loss\par #4. Pt has severe MELIZA and autoCPAP was recommended but he does not feel he could tolerate CPAP therapy and is using his oral appliance instead; consider repeat PSG with weight loss to evaluate for resolution of MELIZA\par #5. Prior CXR was clear\par #6. Diet and exercise for weight loss \par #7. Dental f/u for oral appliance\par #8. F/u with PCP/GI for w/u of possible anemia given prior low FS Hgb \par #9. F/u in 6 months\par #10. Patient would be eligible for lung cancer screening in late 2021 at age 56\par #11. Reviewed risks of exposure and symptoms of Covid-19 virus, including how the virus is spread.\par \par Discussed the following risk-reducing strategies:\par -Wash hands with soap and water (proper technique reviewed) \par -Use alcohol based  when hand-washing is not an option\par -Maintain a social distance of at least 6 feet whenever possible\par -Avoid contact, especially hand shaking\par -Avoid touching eyes, nose, and mouth\par -Cover face/mouth when coughing or sneezing\par -Avoid close contact with sick people\par -Seek medical help if you develop a fever and/or respiratory symptoms (e.g. cough, chest pain, SOB)\par \par Patient's questions were answered and patient appears to understand these recommendations

## 2020-08-19 NOTE — PHYSICAL EXAM
[General Appearance - Well Developed] : well developed [General Appearance - In No Acute Distress] : no acute distress [Normal Appearance] : normal appearance [Normal Conjunctiva] : the conjunctiva exhibited no abnormalities [Low Lying Soft Palate] : low lying soft palate [II] : II [Enlarged Base of the Tongue] : enlargement of the base of the tongue [Neck Appearance] : the appearance of the neck was normal [Murmurs] : no murmurs present [Heart Rate And Rhythm] : heart rate and rhythm were normal [Heart Sounds] : normal S1 and S2 [] : no respiratory distress [Exaggerated Use Of Accessory Muscles For Inspiration] : no accessory muscle use [Respiration, Rhythm And Depth] : normal respiratory rhythm and effort [Edema] : no peripheral edema present [Abdomen Soft] : soft [Auscultation Breath Sounds / Voice Sounds] : lungs were clear to auscultation bilaterally [Abnormal Walk] : normal gait [Nail Clubbing] : no clubbing of the fingernails [Abdomen Tenderness] : non-tender [No Focal Deficits] : no focal deficits [Cyanosis, Localized] : no localized cyanosis [Oriented To Time, Place, And Person] : oriented to person, place, and time [Elongated Uvula] : no elongated uvula [FreeTextEntry1] : No abnormalities.

## 2020-08-19 NOTE — HISTORY OF PRESENT ILLNESS
[Snoring] : snoring [Excess Weight] : excess weight [Back Pain] : back pain [Dyspnea] : dyspnea [Fair Tolerance] : fair tolerance of treatment [Low Calorie Diet] : low calorie diet [Fair Compliance] : fair compliance with treatment [Poor Symptom Control] : poor symptom control [High] : high [Coronary Artery Disease] : coronary artery disease [Well Balanced Diet] : well balanced meals [___ Times/Week] : exercises [unfilled] times per week [Low Calorie] : low calorie [Walking] : walking [Currently Experiencing] : The patient is currently experiencing symptoms. [Dyspnea on Exertion] : dyspnea on exertion [Follow-Up - Routine Clinic] : a routine clinic follow-up of [Short-Acting Beta Agonists] : short-acting beta agonists [None] : No associated symptoms are reported [Anticholinergics (Inhalation)] : inhaled anticholinergics [Oral Appliance] : oral appliance [Good Compliance] : good compliance with treatment [Good Tolerance] : good tolerance of treatment [Good Symptom Control] : good symptom control [FreeTextEntry1] : Patient c/o SOBOE but is otherwise without associated respiratory complaints. He reports these symptoms at higher altitudes. He is an ex-smoker of up to 1.5 ppd x 30 years but quit in 2017. He gained 65 lbs when he quit smoking.\par He is s/p hernia surgery and appendectomy. \par He is doing well since his gastric sleeve surgery in 3/2020 and has lost nearly 80 lbs. He reports improvement in SOBOE.\par  [Excessive Daytime Sleepiness] : no excessive daytime sleepiness [Unrefreshing Sleep] : no unrefreshing sleep [Witnessed Gasping During Sleep] : no witnessed gasping during sleep [Witnessed Apnea During Sleep] : no witnessed apnea during sleep [Sleepy When Sedentary] : no sleepy when sedentary

## 2020-08-19 NOTE — CONSULT LETTER
[Dear  ___] : Dear  [unfilled], [Consult Closing:] : Thank you very much for allowing me to participate in the care of this patient.  If you have any questions, please do not hesitate to contact me. [Please see my note below.] : Please see my note below. [Consult Letter:] : I had the pleasure of evaluating your patient, [unfilled]. [Sincerely,] : Sincerely, [DrJayson  ___] : Dr. ALFORD [DrJayson ___] : Dr. ALFORD [FreeTextEntry3] : Elvis Hanna MD, FCCP, D. ABSM\par Pulmonary and Sleep Medicine\par [facility] [place]\par \par

## 2020-12-01 ENCOUNTER — APPOINTMENT (OUTPATIENT)
Dept: PULMONOLOGY | Facility: CLINIC | Age: 55
End: 2020-12-01
Payer: COMMERCIAL

## 2020-12-01 VITALS — HEART RATE: 63 BPM | RESPIRATION RATE: 16 BRPM | OXYGEN SATURATION: 97 %

## 2020-12-01 VITALS — BODY MASS INDEX: 32.43 KG/M2 | WEIGHT: 226 LBS

## 2020-12-01 PROCEDURE — 99214 OFFICE O/P EST MOD 30 MIN: CPT

## 2020-12-01 PROCEDURE — 99072 ADDL SUPL MATRL&STAF TM PHE: CPT

## 2020-12-01 RX ORDER — METFORMIN HYDROCHLORIDE 500 MG/1
500 TABLET, COATED ORAL
Qty: 60 | Refills: 0 | Status: COMPLETED | COMMUNITY
Start: 2020-10-30 | End: 2020-12-01

## 2020-12-01 RX ORDER — KETOCONAZOLE 20 MG/G
CREAM TOPICAL
Refills: 0 | Status: ACTIVE | COMMUNITY

## 2020-12-01 RX ORDER — APREMILAST 30 MG/1
30 TABLET, FILM COATED ORAL
Qty: 60 | Refills: 0 | Status: COMPLETED | COMMUNITY
Start: 2019-08-28 | End: 2020-12-01

## 2020-12-01 NOTE — CONSULT LETTER
[Dear  ___] : Dear  [unfilled], [Consult Letter:] : I had the pleasure of evaluating your patient, [unfilled]. [Please see my note below.] : Please see my note below. [Consult Closing:] : Thank you very much for allowing me to participate in the care of this patient.  If you have any questions, please do not hesitate to contact me. [Sincerely,] : Sincerely, [DrJayson  ___] : Dr. ALFORD [DrJayson ___] : Dr. ALFORD [FreeTextEntry3] : Elvis Hanna MD, FCCP, D. ABSM\par Pulmonary and Sleep Medicine\par [facility] [place]\par \par

## 2020-12-01 NOTE — DISCUSSION/SUMMARY
[FreeTextEntry1] : \par #1. Continue Breo 100 for moderate COPD given no improvement with Incruse; pt appears to be doing well with this inhaler; consider d/c if lung function normalizes with weight loss\par #2. ProAir as needed\par #3. There may be a restrictive component with weight due to morbid obesity but appears to be improving with weight loss\par #4. Pt has severe MELIZA and autoCPAP was recommended but he does not feel he could tolerate CPAP therapy and is using his oral appliance instead; consider repeat PSG/HST with weight loss to evaluate for resolution of MELIZA\par #5. Prior CXR was clear\par #6. Diet and exercise for weight loss \par #7. Dental f/u for oral appliance\par #8. F/u with PCP/GI for w/u of possible anemia given prior low FS Hgb \par #9. F/u in 6 months with PFTs\par #10. Patient would be eligible for lung cancer screening in late 2021 at age 56\par #11. Reviewed risks of exposure and symptoms of Covid-19 virus, including how the virus is spread and precautions to avoid darci virus.\par \par Patient's questions were answered and patient appears to understand these recommendations

## 2020-12-01 NOTE — HISTORY OF PRESENT ILLNESS
[Snoring] : snoring [Oral Appliance] : oral appliance [Excess Weight] : excess weight [Dyspnea] : dyspnea [Back Pain] : back pain [Low Calorie Diet] : low calorie diet [Fair Compliance] : fair compliance with treatment [Fair Tolerance] : fair tolerance of treatment [Poor Symptom Control] : poor symptom control [Coronary Artery Disease] : coronary artery disease [High] : high [Low Calorie] : low calorie [Well Balanced Diet] : well balanced meals [___ Times/Week] : exercises [unfilled] times per week [Walking] : walking [Follow-Up - Routine Clinic] : a routine clinic follow-up of [Dyspnea on Exertion] : dyspnea on exertion [Currently Experiencing] : The patient is currently experiencing symptoms. [None] : No associated symptoms are reported [Short-Acting Beta Agonists] : short-acting beta agonists [Anticholinergics (Inhalation)] : inhaled anticholinergics [Good Compliance] : good compliance with treatment [Good Tolerance] : good tolerance of treatment [Good Symptom Control] : good symptom control [FreeTextEntry1] : Patient c/o SOBOE but is otherwise without associated respiratory complaints. He reports these symptoms at higher altitudes. He is an ex-smoker of up to 1.5 ppd x 30 years but quit in 2017. He gained 65 lbs when he quit smoking.\par He is s/p hernia surgery and appendectomy. \par He is doing well since his gastric sleeve surgery in 3/2020 and has lost nearly 100 lbs. He reports improvement in SOBOE.\par  [Excessive Daytime Sleepiness] : no excessive daytime sleepiness [Witnessed Apnea During Sleep] : no witnessed apnea during sleep [Witnessed Gasping During Sleep] : no witnessed gasping during sleep [Unrefreshing Sleep] : no unrefreshing sleep [Sleepy When Sedentary] : no sleepy when sedentary

## 2021-06-03 ENCOUNTER — APPOINTMENT (OUTPATIENT)
Dept: DISASTER EMERGENCY | Facility: CLINIC | Age: 56
End: 2021-06-03

## 2021-06-04 LAB — SARS-COV-2 N GENE NPH QL NAA+PROBE: NOT DETECTED

## 2021-06-07 ENCOUNTER — APPOINTMENT (OUTPATIENT)
Dept: PULMONOLOGY | Facility: CLINIC | Age: 56
End: 2021-06-07
Payer: COMMERCIAL

## 2021-06-07 VITALS
OXYGEN SATURATION: 97 % | SYSTOLIC BLOOD PRESSURE: 118 MMHG | DIASTOLIC BLOOD PRESSURE: 78 MMHG | BODY MASS INDEX: 31.14 KG/M2 | HEIGHT: 70.5 IN | HEART RATE: 75 BPM | WEIGHT: 220 LBS

## 2021-06-07 VITALS — TEMPERATURE: 98.3 F

## 2021-06-07 PROCEDURE — 94010 BREATHING CAPACITY TEST: CPT

## 2021-06-07 PROCEDURE — 99072 ADDL SUPL MATRL&STAF TM PHE: CPT

## 2021-06-07 PROCEDURE — 99214 OFFICE O/P EST MOD 30 MIN: CPT | Mod: 25

## 2021-06-07 PROCEDURE — 85018 HEMOGLOBIN: CPT | Mod: QW

## 2021-06-07 PROCEDURE — 94727 GAS DIL/WSHOT DETER LNG VOL: CPT

## 2021-06-07 PROCEDURE — 94729 DIFFUSING CAPACITY: CPT

## 2021-06-07 PROCEDURE — G0296 VISIT TO DETERM LDCT ELIG: CPT

## 2021-06-07 NOTE — CONSULT LETTER
[Dear  ___] : Dear  [unfilled], [Consult Letter:] : I had the pleasure of evaluating your patient, [unfilled]. [Please see my note below.] : Please see my note below. [Consult Closing:] : Thank you very much for allowing me to participate in the care of this patient.  If you have any questions, please do not hesitate to contact me. [Sincerely,] : Sincerely, [DrJayson  ___] : Dr. ALFORD [FreeTextEntry3] : Elvis Hanna MD, FCCP, D. ABSM\par Pulmonary and Sleep Medicine\par [facility] [place]\par \par

## 2021-06-07 NOTE — REASON FOR VISIT
[Follow-Up] : a follow-up visit [Sleep Apnea] : sleep apnea [COPD] : COPD [Shortness of Breath] : shortness of breath [Obesity] : obesity

## 2021-06-07 NOTE — DISCUSSION/SUMMARY
[FreeTextEntry1] : \par #1. Continue observe off of Breo 100 for now mild COPD; pt had no improvement with Incruse and no worsening off of Breo.\par #2. ProAir as needed\par #3. There may be a restrictive component with weight due to morbid obesity but appears to be improving with weight loss\par #4. Pt had severe MELIZA and autoCPAP was recommended but he did not feel he could tolerate CPAP therapy and is using his oral appliance instead; consider repeat PSG/HST with weight loss to evaluate for resolution of MELIZA\par #5. Prior CXR was clear\par #6. Diet and exercise for weight loss \par #7. Dental f/u for oral appliance\par #8. F/u with PCP/GI for w/u of possible anemia given prior low FS Hgb \par #9. Chest CT for lung cancer screening given smoking hx. Risks and benefits regarding screening CT discussed including but not limited to the benefit of early lung cancer detection as well as other possible unknown pathology but also risk of discovering nodules or other findings which may be benign but will require periodic evaluation. \par #10. F/u in 4 months with lung cancer screening chest CT\par #11. Reviewed risks of exposure and symptoms of Covid-19 virus, including how the virus is spread and precautions to avoid darci virus.\par \par Patient's questions were answered and patient appears to understand these recommendations

## 2021-06-07 NOTE — PROCEDURE
[FreeTextEntry1] : PFTs 3/11/19 - Mildly reduced FVC and moderately reduced FEV1 with an obstructive pattern but without a significant BD response. Lung volumes were near normal with a normal diffusion capacity.\par Spirometry 3/20/19 - Moderate COPD without change from previous despite Incruse though has only been on it for 10 days.\par Spirometry 5/28/19 - Moderate COPD despite Incruse so will change to Breo 100\par PFTs 10/3/19 - Mildly reduced FVC and moderately reduced FEV1 with an obstructive pattern; lung volumes were normal except for an elevated RV with a mildly reduced diffusion capacity which corrected for alveolar volume.\par Spirometry 1/8/20 - Mildly reduced FVC and moderately reduced FEV1 with an obstructive pattern without significant change\par PFTs 8/19/20 - Mildly reduced FVC and FEV1 without an obstructive pattern but improved from previous; with essentially normal lung volumes and diffusion capacity\par PFTs 6/7/21 - Low normal FVC and mildly reduced FEV1 with an obstructive pattern but normal lung volumes and diffusion capacity; without change despite coming off of Breo therapy

## 2021-09-30 ENCOUNTER — NON-APPOINTMENT (OUTPATIENT)
Age: 56
End: 2021-09-30

## 2021-09-30 VITALS — BODY MASS INDEX: 30.16 KG/M2 | WEIGHT: 213 LBS | HEIGHT: 70.5 IN

## 2021-09-30 NOTE — HISTORY OF PRESENT ILLNESS
[TextBox_13] : Referred by Dr. Elvis Hanna.\par \par Mr. RHODES is a 56 year old male with a history of MI/CAD s/p stents, HTN, high cholesterol and COPD.\par \par He was called to review eligibility for Low-Dose CT lung cancer screening.  Reviewed and confirmed that the patient meets screening eligibility criteria:\par \par 56 years old \par \par Smoking Status: Former smoker\par \par Number of pack(s) per day: 1.5\par Number of years smoked: 30\par Number of pack years smokin\par \par Number of years since quitting smokin\par Quit year:2017\par \par Mr. RHODES denies any symptoms of lung cancer, including new cough, change in cough, hemoptysis, and unintentional weight loss.\par \par Mr. RHODES denies any personal history of lung cancer.  Admits to lung cancer in a first degree relative, his sister.  Denies any history of occupational exposures.

## 2021-10-06 ENCOUNTER — OUTPATIENT (OUTPATIENT)
Dept: OUTPATIENT SERVICES | Facility: HOSPITAL | Age: 56
LOS: 1 days | End: 2021-10-06
Payer: COMMERCIAL

## 2021-10-06 ENCOUNTER — APPOINTMENT (OUTPATIENT)
Dept: CT IMAGING | Facility: CLINIC | Age: 56
End: 2021-10-06
Payer: COMMERCIAL

## 2021-10-06 DIAGNOSIS — Z87.891 PERSONAL HISTORY OF NICOTINE DEPENDENCE: ICD-10-CM

## 2021-10-06 PROCEDURE — 71271 CT THORAX LUNG CANCER SCR C-: CPT

## 2021-10-06 PROCEDURE — 71271 CT THORAX LUNG CANCER SCR C-: CPT | Mod: 26

## 2021-10-13 ENCOUNTER — APPOINTMENT (OUTPATIENT)
Dept: PULMONOLOGY | Facility: CLINIC | Age: 56
End: 2021-10-13
Payer: COMMERCIAL

## 2021-10-13 VITALS
WEIGHT: 217 LBS | SYSTOLIC BLOOD PRESSURE: 132 MMHG | BODY MASS INDEX: 31.07 KG/M2 | DIASTOLIC BLOOD PRESSURE: 80 MMHG | HEART RATE: 62 BPM | OXYGEN SATURATION: 98 % | HEIGHT: 70 IN | RESPIRATION RATE: 16 BRPM

## 2021-10-13 DIAGNOSIS — Z23 ENCOUNTER FOR IMMUNIZATION: ICD-10-CM

## 2021-10-13 DIAGNOSIS — R59.1 GENERALIZED ENLARGED LYMPH NODES: ICD-10-CM

## 2021-10-13 PROCEDURE — G0008: CPT

## 2021-10-13 PROCEDURE — 99214 OFFICE O/P EST MOD 30 MIN: CPT | Mod: 25

## 2021-10-13 PROCEDURE — 90686 IIV4 VACC NO PRSV 0.5 ML IM: CPT

## 2021-10-13 RX ORDER — NYSTATIN 100000 [USP'U]/G
100000 POWDER TOPICAL
Qty: 60 | Refills: 0 | Status: DISCONTINUED | COMMUNITY
Start: 2020-09-30 | End: 2021-10-13

## 2021-10-13 RX ORDER — FLUTICASONE FUROATE AND VILANTEROL TRIFENATATE 100; 25 UG/1; UG/1
100-25 POWDER RESPIRATORY (INHALATION)
Qty: 1 | Refills: 5 | Status: DISCONTINUED | COMMUNITY
Start: 2019-05-28 | End: 2021-10-13

## 2021-10-13 RX ORDER — HALOBETASOL PROPIONATE 0.5 MG/G
0.05 CREAM TOPICAL
Qty: 50 | Refills: 0 | Status: DISCONTINUED | COMMUNITY
Start: 2019-08-06 | End: 2021-10-13

## 2021-10-13 RX ORDER — ERGOCALCIFEROL 1.25 MG/1
1.25 MG CAPSULE, LIQUID FILLED ORAL
Qty: 4 | Refills: 0 | Status: DISCONTINUED | COMMUNITY
Start: 2019-01-02 | End: 2021-10-13

## 2021-10-13 NOTE — CONSULT LETTER
[Dear  ___] : Dear  [unfilled], [Consult Letter:] : I had the pleasure of evaluating your patient, [unfilled]. [Please see my note below.] : Please see my note below. [Consult Closing:] : Thank you very much for allowing me to participate in the care of this patient.  If you have any questions, please do not hesitate to contact me. [Sincerely,] : Sincerely, [DrJayson  ___] : Dr. ALFORD [FreeTextEntry3] : Elvis Hanna MD, FCCP, D. ABSM\par Pulmonary and Sleep Medicine\par [facility] [place]\par \par

## 2021-10-13 NOTE — DISCUSSION/SUMMARY
[FreeTextEntry1] : \par #1. Continue observe off of Breo 100 for now mild COPD; pt had no improvement with Incruse and no worsening off of Breo.\par #2. ProAir as needed\par #3. There may be a restrictive component with weight due to morbid obesity but appears to be improving with weight loss\par #4. Pt had severe MELIZA and autoCPAP was recommended but he did not feel he could tolerate CPAP therapy and is using his oral appliance instead; consider repeat PSG/HST with weight loss to evaluate for resolution of MELIZA\par #5. Prior CXR was clear\par #6. Diet and exercise for weight loss \par #7. Dental f/u for oral appliance\par #8. F/u with PCP/GI for w/u of possible anemia given prior low FS Hgb \par #9. Chest CT for lung cancer screening given smoking hx. Risks and benefits regarding screening CT discussed including but not limited to the benefit of early lung cancer detection as well as other possible unknown pathology but also risk of discovering nodules or other findings which may be benign but will require periodic evaluation. CT revealed a 2 mm nodule and isolated 1.3 cm lymph node so will repeat in 3 months per recommendation\par #10. F/u in 3 months with chest CT and spirometry\par #11. Pt had both Covid vaccines; Flu vaccine was given per patient's request on 10/13/21\par #12. Reviewed risks of exposure and symptoms of Covid-19 virus, including how the virus is spread and precautions to avoid darci virus.\par \par Patient's questions were answered and patient appears to understand these recommendations

## 2022-01-08 ENCOUNTER — APPOINTMENT (OUTPATIENT)
Dept: DISASTER EMERGENCY | Facility: CLINIC | Age: 57
End: 2022-01-08

## 2022-02-11 ENCOUNTER — OUTPATIENT (OUTPATIENT)
Dept: OUTPATIENT SERVICES | Facility: HOSPITAL | Age: 57
LOS: 1 days | End: 2022-02-11
Payer: COMMERCIAL

## 2022-02-11 ENCOUNTER — APPOINTMENT (OUTPATIENT)
Dept: CT IMAGING | Facility: CLINIC | Age: 57
End: 2022-02-11
Payer: SELF-PAY

## 2022-02-11 DIAGNOSIS — R91.1 SOLITARY PULMONARY NODULE: ICD-10-CM

## 2022-02-11 PROCEDURE — 71250 CT THORAX DX C-: CPT | Mod: 26

## 2022-02-11 PROCEDURE — 71250 CT THORAX DX C-: CPT

## 2022-02-15 ENCOUNTER — APPOINTMENT (OUTPATIENT)
Dept: PULMONOLOGY | Facility: CLINIC | Age: 57
End: 2022-02-15
Payer: COMMERCIAL

## 2022-02-15 VITALS
SYSTOLIC BLOOD PRESSURE: 118 MMHG | DIASTOLIC BLOOD PRESSURE: 68 MMHG | OXYGEN SATURATION: 98 % | HEART RATE: 59 BPM | RESPIRATION RATE: 16 BRPM

## 2022-02-15 VITALS — HEIGHT: 70 IN | WEIGHT: 213 LBS | BODY MASS INDEX: 30.49 KG/M2

## 2022-02-15 DIAGNOSIS — R06.02 SHORTNESS OF BREATH: ICD-10-CM

## 2022-02-15 DIAGNOSIS — E66.01 MORBID (SEVERE) OBESITY DUE TO EXCESS CALORIES: ICD-10-CM

## 2022-02-15 DIAGNOSIS — J44.9 CHRONIC OBSTRUCTIVE PULMONARY DISEASE, UNSPECIFIED: ICD-10-CM

## 2022-02-15 DIAGNOSIS — G47.33 OBSTRUCTIVE SLEEP APNEA (ADULT) (PEDIATRIC): ICD-10-CM

## 2022-02-15 DIAGNOSIS — R91.1 SOLITARY PULMONARY NODULE: ICD-10-CM

## 2022-02-15 PROCEDURE — 99214 OFFICE O/P EST MOD 30 MIN: CPT | Mod: 25

## 2022-02-15 PROCEDURE — 94010 BREATHING CAPACITY TEST: CPT

## 2022-02-15 NOTE — HISTORY OF PRESENT ILLNESS
[Snoring] : snoring [Oral Appliance] : oral appliance [Excess Weight] : excess weight [Dyspnea] : dyspnea [Back Pain] : back pain [Low Calorie Diet] : low calorie diet [Fair Compliance] : fair compliance with treatment [Fair Tolerance] : fair tolerance of treatment [Poor Symptom Control] : poor symptom control [Coronary Artery Disease] : coronary artery disease [High] : high [Low Calorie] : low calorie [Well Balanced Diet] : well balanced meals [___ Times/Week] : exercises [unfilled] times per week [Walking] : walking [Follow-Up - Routine Clinic] : a routine clinic follow-up of [Dyspnea on Exertion] : dyspnea on exertion [Currently Experiencing] : The patient is currently experiencing symptoms. [Short-Acting Beta Agonists] : short-acting beta agonists [Good Compliance] : good compliance with treatment [Good Tolerance] : good tolerance of treatment [Good Symptom Control] : good symptom control [On ___] : performed on [unfilled] [Patient] : the patient [Indication ___] : for an indication of [unfilled] [None] : no new symptoms reported [FreeTextEntry1] : Patient c/o SOBOE but is otherwise without associated respiratory complaints. He reports these symptoms at higher altitudes. He is an ex-smoker of up to 1.5 ppd x 30 years but quit in 2017. He gained 65 lbs when he quit smoking.\par He is s/p hernia surgery and appendectomy. \par He is doing well since his gastric sleeve surgery in 3/2020 and has lost nearly 100 lbs. He reports improvement in SOBOE.\par  [Excessive Daytime Sleepiness] : no excessive daytime sleepiness [Witnessed Apnea During Sleep] : no witnessed apnea during sleep [Witnessed Gasping During Sleep] : no witnessed gasping during sleep [Unrefreshing Sleep] : no unrefreshing sleep [Sleepy When Sedentary] : no sleepy when sedentary [FreeTextEntry9] : Chest CT [FreeTextEntry8] : 2 mm DYLON nodule with 1.3 cm lymph node

## 2022-02-15 NOTE — DISCUSSION/SUMMARY
[FreeTextEntry1] : \par #1. Continue observe off of Breo 100 for now mild COPD; pt had no improvement with Incruse and no worsening off of Breo \par #2. ProAir as needed\par #3. There may be a restrictive component with weight due to morbid obesity but appears to be improving with weight loss but with evidence of Gold Stage II COPD on spirometry\par #4. Pt had severe MELIZA and autoCPAP was recommended but he did not feel he could tolerate CPAP therapy and is using his oral appliance instead; will repeat HST with weight loss to evaluate for resolution of MELIZA; if persists, consider study with oral appliance\par #5. Current CT revealed stable 2 mm nodule, TAMIKO, and emphysematous changes; repeat in one year (2/2023)\par #6. Diet and exercise for weight loss \par #7. Dental f/u for oral appliance if desired\par #8. F/u with PCP/GI for w/u of possible anemia given prior low FS Hgb \par #9. Chest CT for lung cancer screening given smoking hx. Risks and benefits regarding screening CT discussed including but not limited to the benefit of early lung cancer detection as well as other possible unknown pathology but also risk of discovering nodules or other findings which may be benign but will require periodic evaluation. CT revealed a 2 mm nodule and isolated 1.3 cm lymph node which are stable so repeat in one year\par #10. F/u after HST and again in 12 months with chest CT and PFTs\par #11. Pt had both Covid vaccines and booster; Flu vaccine was given per patient's request on 10/13/21\par #12. Reviewed risks of exposure and symptoms of Covid-19 virus, including how the virus is spread and precautions to avoid darci virus.\par \par The patient expressed understanding and agreement with the above recommendations/plan and accepts responsibility to be compliant with recommended testing, therapies, and f/u visits.\par All relevant questions and concerns were addressed.

## 2022-02-15 NOTE — CONSULT LETTER
[Dear  ___] : Dear  [unfilled], [Consult Letter:] : I had the pleasure of evaluating your patient, [unfilled]. [Please see my note below.] : Please see my note below. [Consult Closing:] : Thank you very much for allowing me to participate in the care of this patient.  If you have any questions, please do not hesitate to contact me. [Sincerely,] : Sincerely, [DrJayson  ___] : Dr. ALFORD [FreeTextEntry3] : Elvis Hanna MD, FCCP, D. ABSM\par Pulmonary and Sleep Medicine\par [facility] [place]\par \par

## 2022-02-15 NOTE — PROCEDURE
[FreeTextEntry1] : PFTs 3/11/19 - Mildly reduced FVC and moderately reduced FEV1 with an obstructive pattern but without a significant BD response. Lung volumes were near normal with a normal diffusion capacity.\par Spirometry 3/20/19 - Moderate COPD without change from previous despite Incruse though has only been on it for 10 days.\par Spirometry 5/28/19 - Moderate COPD despite Incruse so will change to Breo 100\par PFTs 10/3/19 - Mildly reduced FVC and moderately reduced FEV1 with an obstructive pattern; lung volumes were normal except for an elevated RV with a mildly reduced diffusion capacity which corrected for alveolar volume.\par Spirometry 1/8/20 - Mildly reduced FVC and moderately reduced FEV1 with an obstructive pattern without significant change\par PFTs 8/19/20 - Mildly reduced FVC and FEV1 without an obstructive pattern but improved from previous; with essentially normal lung volumes and diffusion capacity\par PFTs 6/7/21 - Low normal FVC and mildly reduced FEV1 with an obstructive pattern but normal lung volumes and diffusion capacity; without change despite coming off of Breo therapy\par Spirometry 2/15/22 - Normal FVC and mildly reduced FEV1 with an obstructive pattern c/w Gold Stage II COPD; unchanged from previous

## 2022-05-10 ENCOUNTER — NON-APPOINTMENT (OUTPATIENT)
Age: 57
End: 2022-05-10

## 2022-05-10 ENCOUNTER — APPOINTMENT (OUTPATIENT)
Dept: ELECTROPHYSIOLOGY | Facility: CLINIC | Age: 57
End: 2022-05-10
Payer: COMMERCIAL

## 2022-05-10 VITALS
OXYGEN SATURATION: 97 % | HEART RATE: 46 BPM | DIASTOLIC BLOOD PRESSURE: 73 MMHG | HEIGHT: 70 IN | BODY MASS INDEX: 30.64 KG/M2 | RESPIRATION RATE: 14 BRPM | SYSTOLIC BLOOD PRESSURE: 134 MMHG | WEIGHT: 214 LBS

## 2022-05-10 PROCEDURE — 99204 OFFICE O/P NEW MOD 45 MIN: CPT

## 2022-05-10 PROCEDURE — 93000 ELECTROCARDIOGRAM COMPLETE: CPT

## 2022-05-10 RX ORDER — ALBUTEROL SULFATE 90 UG/1
108 (90 BASE) INHALANT RESPIRATORY (INHALATION)
Qty: 3 | Refills: 1 | Status: DISCONTINUED | COMMUNITY
Start: 2019-09-17 | End: 2022-05-10

## 2022-05-12 NOTE — REVIEW OF SYSTEMS
[Feeling Fatigued] : feeling fatigued [Negative] : Heme/Lymph [FreeTextEntry2] : see HPI [FreeTextEntry5] : see HPI

## 2022-05-12 NOTE — DISCUSSION/SUMMARY
[FreeTextEntry1] : Mr. Fitzpatrick is a pleasant 56 year old male with frequent PVCs in the setting of DM, hypertension, CAD s/p stents. 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 Recent evaluation has shown a PVC burden of 35% and normal LV systolic function. He was reassured that in this setting these ectopic beats are not life threatening and as such therapy is generally aimed at symptom relief. However, it is very concerning to see a increased burden of PVC, which could potentially weaken the heart. Therapeutic options were discussed in detail, which included antiarrhythmics or the option of electrophysiology testing with an aim at ablation. After a lengthy discussion, the decision was made to proceed with the ablation. \par \par \par \par \par .\par \par

## 2022-05-12 NOTE — HISTORY OF PRESENT ILLNESS
[FreeTextEntry1] : Mr. Fitzpatrick is a 56 year old male presenting today for initial evaluation of frequent PVCs. PMH includes COPD, lympadenopathy, morbid obesity s/p gastric sleeve surgery in 2020 and has lost 120 lbs, MELIZA (on mouth piece), h/o CAD s/p stents (2006 and 2010) and ischemic cardiomyopathy with improvement in EF 55%. The patient reports that in the past he experienced occasional fluttering sensation. However, since last September, his symptoms are quite frequent ans has noticed his pulse around 40s. 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 He went to see Dr. Sahu for further evaluation. He wore a 24 hours holter monitor, which revealed PVC burden of 35% and APC burden of <1%. Currently on bystolic. He presents today for further evaluation.\par \par The patient reports that since last September the fluttering sensation has become persistent. He denies any other associated symptoms ie; SOB, chest pain, lightheadedness and dizziness. He has tried lifestyle modifications such as reduced caffeine intake and smoking cessation. However, his symptoms have not improved. He has also been feeling quite tired, which is limiting his daily activities. \par \par \par \par

## 2022-05-12 NOTE — CARDIOLOGY SUMMARY
[de-identified] : 05/10/22 - SR with frequent PVCs [de-identified] : 24 hour holter 9/17/2021\par 35% VE

## 2022-05-12 NOTE — PHYSICAL EXAM

## 2022-06-20 ENCOUNTER — OUTPATIENT (OUTPATIENT)
Dept: OUTPATIENT SERVICES | Facility: HOSPITAL | Age: 57
LOS: 1 days | End: 2022-06-20
Payer: COMMERCIAL

## 2022-06-20 ENCOUNTER — APPOINTMENT (OUTPATIENT)
Age: 57
End: 2022-06-20

## 2022-06-20 DIAGNOSIS — G47.33 OBSTRUCTIVE SLEEP APNEA (ADULT) (PEDIATRIC): ICD-10-CM

## 2022-06-20 PROCEDURE — 95800 SLP STDY UNATTENDED: CPT

## 2022-06-20 PROCEDURE — ZZZZZ: CPT

## 2022-07-23 ENCOUNTER — OUTPATIENT (OUTPATIENT)
Dept: OUTPATIENT SERVICES | Facility: HOSPITAL | Age: 57
LOS: 1 days | End: 2022-07-23
Payer: COMMERCIAL

## 2022-07-23 DIAGNOSIS — Z11.52 ENCOUNTER FOR SCREENING FOR COVID-19: ICD-10-CM

## 2022-07-23 LAB — SARS-COV-2 RNA SPEC QL NAA+PROBE: SIGNIFICANT CHANGE UP

## 2022-07-23 PROCEDURE — U0005: CPT

## 2022-07-23 PROCEDURE — C9803: CPT

## 2022-07-23 PROCEDURE — U0003: CPT

## 2022-07-25 LAB
ALBUMIN SERPL ELPH-MCNC: 4.4 G/DL
ALP BLD-CCNC: 45 U/L
ALT SERPL-CCNC: 27 U/L
ANION GAP SERPL CALC-SCNC: 9 MMOL/L
AST SERPL-CCNC: 20 U/L
BASOPHILS # BLD AUTO: 0.03 K/UL
BASOPHILS NFR BLD AUTO: 0.5 %
BILIRUB SERPL-MCNC: 0.7 MG/DL
BUN SERPL-MCNC: 13 MG/DL
CALCIUM SERPL-MCNC: 9.5 MG/DL
CHLORIDE SERPL-SCNC: 106 MMOL/L
CO2 SERPL-SCNC: 26 MMOL/L
CREAT SERPL-MCNC: 1.01 MG/DL
EGFR: 88 ML/MIN/1.73M2
EOSINOPHIL # BLD AUTO: 0.26 K/UL
EOSINOPHIL NFR BLD AUTO: 4.1 %
GLUCOSE SERPL-MCNC: 65 MG/DL
HCT VFR BLD CALC: 44.1 %
HGB BLD-MCNC: 15 G/DL
IMM GRANULOCYTES NFR BLD AUTO: 0.2 %
INR PPP: 1.06 RATIO
LYMPHOCYTES # BLD AUTO: 2.15 K/UL
LYMPHOCYTES NFR BLD AUTO: 34.1 %
MAN DIFF?: NORMAL
MCHC RBC-ENTMCNC: 29.1 PG
MCHC RBC-ENTMCNC: 34 GM/DL
MCV RBC AUTO: 85.5 FL
MONOCYTES # BLD AUTO: 0.48 K/UL
MONOCYTES NFR BLD AUTO: 7.6 %
NEUTROPHILS # BLD AUTO: 3.37 K/UL
NEUTROPHILS NFR BLD AUTO: 53.5 %
PLATELET # BLD AUTO: 143 K/UL
POTASSIUM SERPL-SCNC: 5 MMOL/L
PROT SERPL-MCNC: 6 G/DL
PT BLD: 12.4 SEC
RBC # BLD: 5.16 M/UL
RBC # FLD: 13.2 %
SODIUM SERPL-SCNC: 141 MMOL/L
WBC # FLD AUTO: 6.3 K/UL

## 2022-07-27 ENCOUNTER — INPATIENT (INPATIENT)
Facility: HOSPITAL | Age: 57
LOS: 0 days | Discharge: HOME | DRG: 274 | End: 2022-07-27
Attending: INTERNAL MEDICINE | Admitting: INTERNAL MEDICINE
Payer: COMMERCIAL

## 2022-07-27 ENCOUNTER — TRANSCRIPTION ENCOUNTER (OUTPATIENT)
Age: 57
End: 2022-07-27

## 2022-07-27 VITALS
RESPIRATION RATE: 17 BRPM | SYSTOLIC BLOOD PRESSURE: 129 MMHG | OXYGEN SATURATION: 100 % | DIASTOLIC BLOOD PRESSURE: 81 MMHG | HEART RATE: 64 BPM | TEMPERATURE: 98 F

## 2022-07-27 VITALS
OXYGEN SATURATION: 100 % | SYSTOLIC BLOOD PRESSURE: 146 MMHG | TEMPERATURE: 98 F | HEIGHT: 72 IN | WEIGHT: 214.07 LBS | DIASTOLIC BLOOD PRESSURE: 93 MMHG | RESPIRATION RATE: 20 BRPM | HEART RATE: 69 BPM

## 2022-07-27 DIAGNOSIS — Z98.89 OTHER SPECIFIED POSTPROCEDURAL STATES: Chronic | ICD-10-CM

## 2022-07-27 DIAGNOSIS — Z90.3 ACQUIRED ABSENCE OF STOMACH [PART OF]: Chronic | ICD-10-CM

## 2022-07-27 DIAGNOSIS — Z95.5 PRESENCE OF CORONARY ANGIOPLASTY IMPLANT AND GRAFT: Chronic | ICD-10-CM

## 2022-07-27 DIAGNOSIS — Z98.890 OTHER SPECIFIED POSTPROCEDURAL STATES: Chronic | ICD-10-CM

## 2022-07-27 DIAGNOSIS — I49.3 VENTRICULAR PREMATURE DEPOLARIZATION: ICD-10-CM

## 2022-07-27 PROCEDURE — 93654 COMPRE EP EVAL TX VT: CPT

## 2022-07-27 PROCEDURE — C1894: CPT

## 2022-07-27 PROCEDURE — 86901 BLOOD TYPING SEROLOGIC RH(D): CPT

## 2022-07-27 PROCEDURE — C1732: CPT

## 2022-07-27 PROCEDURE — 93662 INTRACARDIAC ECG (ICE): CPT

## 2022-07-27 PROCEDURE — 86850 RBC ANTIBODY SCREEN: CPT

## 2022-07-27 PROCEDURE — 93010 ELECTROCARDIOGRAM REPORT: CPT

## 2022-07-27 PROCEDURE — 93623 PRGRMD STIMJ&PACG IV RX NFS: CPT

## 2022-07-27 PROCEDURE — 93662 INTRACARDIAC ECG (ICE): CPT | Mod: 26

## 2022-07-27 PROCEDURE — C1730: CPT

## 2022-07-27 PROCEDURE — C1760: CPT

## 2022-07-27 PROCEDURE — C1759: CPT

## 2022-07-27 PROCEDURE — 93005 ELECTROCARDIOGRAM TRACING: CPT

## 2022-07-27 PROCEDURE — 93623 PRGRMD STIMJ&PACG IV RX NFS: CPT | Mod: 26

## 2022-07-27 PROCEDURE — 86900 BLOOD TYPING SEROLOGIC ABO: CPT

## 2022-07-27 RX ORDER — ASPIRIN/CALCIUM CARB/MAGNESIUM 324 MG
81 TABLET ORAL DAILY
Refills: 0 | Status: DISCONTINUED | OUTPATIENT
Start: 2022-07-27 | End: 2022-07-27

## 2022-07-27 RX ORDER — NEBIVOLOL HYDROCHLORIDE 5 MG/1
1 TABLET ORAL
Qty: 0 | Refills: 0 | DISCHARGE

## 2022-07-27 RX ORDER — SOTALOL HCL 120 MG
120 TABLET ORAL EVERY 12 HOURS
Refills: 0 | Status: DISCONTINUED | OUTPATIENT
Start: 2022-07-27 | End: 2022-07-27

## 2022-07-27 RX ORDER — EVOLOCUMAB 140 MG/ML
0 INJECTION, SOLUTION SUBCUTANEOUS
Qty: 0 | Refills: 0 | DISCHARGE

## 2022-07-27 RX ORDER — ASPIRIN/CALCIUM CARB/MAGNESIUM 324 MG
1 TABLET ORAL
Qty: 0 | Refills: 0 | DISCHARGE

## 2022-07-27 RX ORDER — LISINOPRIL 2.5 MG/1
10 TABLET ORAL DAILY
Refills: 0 | Status: DISCONTINUED | OUTPATIENT
Start: 2022-07-27 | End: 2022-07-27

## 2022-07-27 RX ORDER — RAMIPRIL 5 MG
0 CAPSULE ORAL
Qty: 0 | Refills: 0 | DISCHARGE

## 2022-07-27 NOTE — ASU DISCHARGE PLAN (ADULT/PEDIATRIC) - ASU DC SPECIAL INSTRUCTIONSFT
WOUND CARE:  The day AFTER your procedure  - Remove the bandage at the site GENTLY, clean with mild soap and water, and pat dry; leave open to air  - You may shower   - DO NOT apply lotions, creams, ointments, powder, perfumes to your incision site  -Check your groin every day. A small amount of bruising or soreness is normal, a bump ( smaller than nickel) might be present, normal  - DO NOT SOAK your site for 1 week ( no baths, no pools, no tubs, etc..)    ACTIVITY:  Your procedure was done through your groin  for the next 5 DAYS:  - Limit climbing stairs, no strenuous activity, pushing , pulling, or straining   DO NOT LIFT anything 10 lbs or heavier   you may resume sexual activity in 7 days, unless instructed otherwise  mild palpitations are normal     Follow heart healthy diet reccomended by your doctor, if you smoke STOP SMOKING ( may call [phone] for center of tobacco control if you need assistance).  for the next 24 hours:   - stay at home and rest, do not drive or operate heavy machinery  do not drink alcoholic beverages   do not make important personal or business decisions     ***CALL YOUR DOCTOR ***  IF you have fever, chills, body aches, or severe pain, swelling, redness, heat, yellow drainage from your incision site  IF bleeding  or significant new swelling from your puncture site  IF you experience rapid heartbeat or palpitations that cause: lightheadedness, dizziness, or fainting spell.  If you experience difficulty swallowing, or pain with swallowing   IF unable to get in contact with your doctor, you may call the Cardiology Office at [facility] at [phone]

## 2022-07-27 NOTE — PRE-ANESTHESIA EVALUATION ADULT - NSANTHPMHFT_GEN_ALL_CORE
55 yo M h/o COPD (no home O2), morbid obesity s/p gastric sleeve with 120 lb weight loss, CAD s/p 2 stents on ASA, MELIZA (no CPAP, uses mouthpiece) here for PVC ablation  ET > 4 METS, ROS otherwise normal

## 2022-07-27 NOTE — ASU DISCHARGE PLAN (ADULT/PEDIATRIC) - PROVIDER TOKENS
PROVIDER:[TOKEN:[[id number]],SCHEDULEDAPPT:[09/13/2022],SCHEDULEDAPPTTIME:[11:30 AM],ESTABLISHEDPATIENT:[T]]

## 2022-07-27 NOTE — H&P CARDIOLOGY - NSICDXPASTMEDICALHX_GEN_ALL_CORE_FT
PAST MEDICAL HISTORY:  CAD (coronary artery disease)     COPD (chronic obstructive pulmonary disease)     Obesity     Obstructive sleep apnea syndrome     PVC (premature ventricular contraction)

## 2022-07-27 NOTE — ASU DISCHARGE PLAN (ADULT/PEDIATRIC) - CARE PROVIDER_API CALL
Wan Bourne)  Cardiac Electrophysiology; Cardiology  [address] [place] [address]  Phone: [phone]  Fax: [phone]  Established Patient  Scheduled Appointment: 09/13/2022 11:30 AM

## 2022-07-27 NOTE — H&P CARDIOLOGY - HISTORY OF PRESENT ILLNESS
Mr. Fitzpatrick is a 56 year old male, PMH includes COPD, lymphadenopathy, morbid obesity s/p gastric sleeve surgery in 2020 and has lost 120 lbs, MELIZA (on mouth piece), h/o CAD s/p stents (2006 and 2010) and ischemic cardiomyopathy with improvement in EF 55%. The patient reports that in the past he experienced occasional fluttering sensation. However, since last September, his symptoms are quite frequent ans has noticed his pulse around 40s. 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 He went to see Dr. Sahu for further evaluation. He wore a 24 hours holter monitor, which revealed PVC burden of 35% and APC burden of <1%. Currently on bystolic. Seen & evaluated by Dr Bourne & n js recommends for PVC ablation.   Mr. Fitzpatrick is a 56 year old male, PMH includes former smoker-COPD, lymphadenopathy, morbid obesity s/p gastric sleeve surgery in 2020 and has lost 120 lbs, MELIZA (on mouth piece), h/o CAD s/p stents (2006 and 2010) and ischemic cardiomyopathy with improvement in EF 55%. The patient reports that in the past he experienced occasional fluttering sensation. However, since last September, his symptoms are quite frequent ans has noticed his pulse around 40s. 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 He went to see Dr. Sahu for further evaluation. He wore a 24 hours holter monitor, which revealed PVC burden of 35% and APC burden of <1%. Currently on bystolic. Seen & evaluated by Dr Bourne & n js recommends for PVC ablation.

## 2022-07-27 NOTE — CHART NOTE - NSCHARTNOTEFT_GEN_A_CORE
BRIAN RHODES  [id number]    PROCEDURE:  Eps and ablation   successful ablation of posteroseptal RVOT PVCs    INDICATION:  35% [place]         ELECTROPHYSIOLOGIST(S):  Dr. Bourne   Fellow Dr. 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 RBBB and his areas were  tagged,   successful ablation   normal intracardiac conduction and electrical intervals pre and post ablation   no effusion on ice.    COMPLICATIONS:    none     RECOMMENDATIONS:  bed rest for 3 hours   discharge home later on today   continue current medications

## 2022-07-27 NOTE — ASU DISCHARGE PLAN (ADULT/PEDIATRIC) - NS MD DC FALL RISK RISK
For information on Fall & Injury Prevention, visit: [email or web address] OR  [email or web address] OR  https://www.cdc.gov/steadi/patient.html

## 2022-07-28 ENCOUNTER — NON-APPOINTMENT (OUTPATIENT)
Age: 57
End: 2022-07-28

## 2022-09-13 ENCOUNTER — APPOINTMENT (OUTPATIENT)
Dept: ELECTROPHYSIOLOGY | Facility: CLINIC | Age: 57
End: 2022-09-13

## 2022-10-11 ENCOUNTER — APPOINTMENT (OUTPATIENT)
Dept: ELECTROPHYSIOLOGY | Facility: CLINIC | Age: 57
End: 2022-10-11

## 2022-10-11 ENCOUNTER — NON-APPOINTMENT (OUTPATIENT)
Age: 57
End: 2022-10-11

## 2022-10-11 VITALS
HEIGHT: 70 IN | DIASTOLIC BLOOD PRESSURE: 85 MMHG | SYSTOLIC BLOOD PRESSURE: 145 MMHG | WEIGHT: 214 LBS | BODY MASS INDEX: 30.64 KG/M2 | HEART RATE: 70 BPM | OXYGEN SATURATION: 97 %

## 2022-10-11 DIAGNOSIS — I49.3 VENTRICULAR PREMATURE DEPOLARIZATION: ICD-10-CM

## 2022-10-11 PROCEDURE — 93000 ELECTROCARDIOGRAM COMPLETE: CPT

## 2022-10-11 PROCEDURE — 99213 OFFICE O/P EST LOW 20 MIN: CPT | Mod: 25

## 2022-10-11 NOTE — DISCUSSION/SUMMARY
[FreeTextEntry1] : Mr. Fitzpatrick is a pleasant 56 year old male with frequent PVCs in the setting of DM, hypertension, CAD s/p stents. 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 Recent evaluation has shown a PVC burden of 35% and normal LV systolic function. On July 27, 2022 he underwent EP testing and ablation of a focus mapped to the posterior RVOT. Doing well. Follow up as needed. \par \par \par .\par \par

## 2022-10-11 NOTE — HISTORY OF PRESENT ILLNESS
[FreeTextEntry1] : Mr. Fitzpatrick is a pleasant 56 year old male with frequent PVCs in the setting of DM, hypertension, CAD s/p stents. 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 Recent evaluation has shown a PVC burden of 35% and normal LV systolic function. On July 27, 2022 he underwent EP testing and ablation of a focus mapped to the posterior RVOT. \par \par No complaints.  No palpitations. No chest pain. No shortness of breath.  The groin is well healed.

## 2022-10-11 NOTE — CARDIOLOGY SUMMARY
[de-identified] : Today: SR\par \par 05/10/22 - SR with frequent PVCs [de-identified] : 24 hour holter 9/17/2021\par 35% VE [de-identified] : 7/27/2022\par Successful ablation of posteroseptal RVOT

## 2023-02-02 PROBLEM — J44.9 CHRONIC OBSTRUCTIVE PULMONARY DISEASE, UNSPECIFIED: Chronic | Status: ACTIVE | Noted: 2022-07-27

## 2023-02-02 PROBLEM — G47.33 OBSTRUCTIVE SLEEP APNEA (ADULT) (PEDIATRIC): Chronic | Status: ACTIVE | Noted: 2022-07-27

## 2023-02-02 PROBLEM — I49.3 VENTRICULAR PREMATURE DEPOLARIZATION: Chronic | Status: ACTIVE | Noted: 2022-07-27

## 2023-02-02 PROBLEM — E66.9 OBESITY, UNSPECIFIED: Chronic | Status: ACTIVE | Noted: 2022-07-27

## 2023-02-02 PROBLEM — I25.10 ATHEROSCLEROTIC HEART DISEASE OF NATIVE CORONARY ARTERY WITHOUT ANGINA PECTORIS: Chronic | Status: ACTIVE | Noted: 2022-07-27

## 2023-02-09 ENCOUNTER — NON-APPOINTMENT (OUTPATIENT)
Age: 58
End: 2023-02-09

## 2023-02-09 VITALS — BODY MASS INDEX: 30.64 KG/M2 | WEIGHT: 214 LBS | HEIGHT: 70 IN

## 2023-02-09 DIAGNOSIS — Z87.891 PERSONAL HISTORY OF NICOTINE DEPENDENCE: ICD-10-CM

## 2023-02-09 NOTE — HISTORY OF PRESENT ILLNESS
[Former] : Former [TextBox_13] : Referred by Dr. Elvis Hanna.\par \par Mr. RHODES is a 57 year old male with a history of MI/CAD s/p stents, HTN, high cholesterol and COPD. Reviewed and confirmed that the patient meets screening eligibility criteria:\par \par 57 years old \par \par Smoking Status: Former smoker\par \par Number of pack(s) per day: 1.5\par Number of years smoked: 30\par Number of pack years smokin\par \par Number of years since quitting smokin\par Quit year:2017\par \par No symptoms of lung cancer, including new cough, change in cough, hemoptysis, and unintentional weight loss.\par \par No personal history of lung cancer. History of lung cancer in a first degree relative, his sister. No history of occupational exposures.  [YearQuit] : 2017 [PacksperDay] : 1.5 [N_Years] : 30 [PacksperYear] : 45

## 2023-02-13 ENCOUNTER — APPOINTMENT (OUTPATIENT)
Dept: CT IMAGING | Facility: CLINIC | Age: 58
End: 2023-02-13
Payer: COMMERCIAL

## 2023-02-13 ENCOUNTER — OUTPATIENT (OUTPATIENT)
Dept: OUTPATIENT SERVICES | Facility: HOSPITAL | Age: 58
LOS: 1 days | End: 2023-02-13
Payer: COMMERCIAL

## 2023-02-13 DIAGNOSIS — Z87.891 PERSONAL HISTORY OF NICOTINE DEPENDENCE: ICD-10-CM

## 2023-02-13 PROCEDURE — 71271 CT THORAX LUNG CANCER SCR C-: CPT

## 2023-02-13 PROCEDURE — 71271 CT THORAX LUNG CANCER SCR C-: CPT | Mod: 26

## 2025-08-18 ENCOUNTER — APPOINTMENT (OUTPATIENT)
Dept: PULMONOLOGY | Facility: CLINIC | Age: 60
End: 2025-08-18
Payer: COMMERCIAL

## 2025-08-18 VITALS
RESPIRATION RATE: 16 BRPM | HEART RATE: 74 BPM | HEIGHT: 71 IN | WEIGHT: 226 LBS | BODY MASS INDEX: 31.64 KG/M2 | SYSTOLIC BLOOD PRESSURE: 128 MMHG | DIASTOLIC BLOOD PRESSURE: 74 MMHG | OXYGEN SATURATION: 98 %

## 2025-08-18 DIAGNOSIS — E66.01 MORBID (SEVERE) OBESITY DUE TO EXCESS CALORIES: ICD-10-CM

## 2025-08-18 DIAGNOSIS — R91.1 SOLITARY PULMONARY NODULE: ICD-10-CM

## 2025-08-18 DIAGNOSIS — J44.9 CHRONIC OBSTRUCTIVE PULMONARY DISEASE, UNSPECIFIED: ICD-10-CM

## 2025-08-18 DIAGNOSIS — R59.1 GENERALIZED ENLARGED LYMPH NODES: ICD-10-CM

## 2025-08-18 DIAGNOSIS — R06.02 SHORTNESS OF BREATH: ICD-10-CM

## 2025-08-18 DIAGNOSIS — G47.33 OBSTRUCTIVE SLEEP APNEA (ADULT) (PEDIATRIC): ICD-10-CM

## 2025-08-18 PROCEDURE — 99204 OFFICE O/P NEW MOD 45 MIN: CPT

## 2025-08-18 PROCEDURE — G0296 VISIT TO DETERM LDCT ELIG: CPT

## 2025-08-19 ENCOUNTER — NON-APPOINTMENT (OUTPATIENT)
Age: 60
End: 2025-08-19

## 2025-08-19 VITALS — HEIGHT: 71 IN | WEIGHT: 226 LBS | BODY MASS INDEX: 31.64 KG/M2

## 2025-08-19 DIAGNOSIS — Z80.1 FAMILY HISTORY OF MALIGNANT NEOPLASM OF TRACHEA, BRONCHUS AND LUNG: ICD-10-CM

## 2025-08-19 DIAGNOSIS — Z87.891 PERSONAL HISTORY OF NICOTINE DEPENDENCE: ICD-10-CM

## 2025-08-29 ENCOUNTER — APPOINTMENT (OUTPATIENT)
Dept: CT IMAGING | Facility: CLINIC | Age: 60
End: 2025-08-29

## 2025-08-29 ENCOUNTER — OUTPATIENT (OUTPATIENT)
Dept: OUTPATIENT SERVICES | Facility: HOSPITAL | Age: 60
LOS: 1 days | End: 2025-08-29
Payer: COMMERCIAL

## 2025-08-29 DIAGNOSIS — Z98.89 OTHER SPECIFIED POSTPROCEDURAL STATES: Chronic | ICD-10-CM

## 2025-08-29 DIAGNOSIS — Z90.3 ACQUIRED ABSENCE OF STOMACH [PART OF]: Chronic | ICD-10-CM

## 2025-08-29 DIAGNOSIS — Z87.891 PERSONAL HISTORY OF NICOTINE DEPENDENCE: ICD-10-CM

## 2025-08-29 DIAGNOSIS — Z95.5 PRESENCE OF CORONARY ANGIOPLASTY IMPLANT AND GRAFT: Chronic | ICD-10-CM

## 2025-08-29 DIAGNOSIS — Z98.890 OTHER SPECIFIED POSTPROCEDURAL STATES: Chronic | ICD-10-CM

## 2025-08-29 PROCEDURE — 71271 CT THORAX LUNG CANCER SCR C-: CPT | Mod: 26

## 2025-08-29 PROCEDURE — 71271 CT THORAX LUNG CANCER SCR C-: CPT
